# Patient Record
Sex: FEMALE | Race: WHITE
[De-identification: names, ages, dates, MRNs, and addresses within clinical notes are randomized per-mention and may not be internally consistent; named-entity substitution may affect disease eponyms.]

---

## 2023-02-05 ENCOUNTER — HOSPITAL ENCOUNTER (INPATIENT)
Dept: HOSPITAL 4 - SED | Age: 88
LOS: 8 days | Discharge: NURSING, REHAB OR LONG-TERM CARE | DRG: 871 | End: 2023-02-13
Attending: FAMILY MEDICINE | Admitting: FAMILY MEDICINE
Payer: COMMERCIAL

## 2023-02-05 VITALS — SYSTOLIC BLOOD PRESSURE: 101 MMHG

## 2023-02-05 VITALS — BODY MASS INDEX: 16.05 KG/M2 | HEIGHT: 64 IN | WEIGHT: 94 LBS

## 2023-02-05 VITALS — SYSTOLIC BLOOD PRESSURE: 125 MMHG

## 2023-02-05 DIAGNOSIS — K44.9: ICD-10-CM

## 2023-02-05 DIAGNOSIS — E03.9: ICD-10-CM

## 2023-02-05 DIAGNOSIS — M54.9: ICD-10-CM

## 2023-02-05 DIAGNOSIS — G30.9: ICD-10-CM

## 2023-02-05 DIAGNOSIS — E86.1: ICD-10-CM

## 2023-02-05 DIAGNOSIS — Z86.73: ICD-10-CM

## 2023-02-05 DIAGNOSIS — D63.8: ICD-10-CM

## 2023-02-05 DIAGNOSIS — R62.7: ICD-10-CM

## 2023-02-05 DIAGNOSIS — Z87.440: ICD-10-CM

## 2023-02-05 DIAGNOSIS — Z66: ICD-10-CM

## 2023-02-05 DIAGNOSIS — J69.0: ICD-10-CM

## 2023-02-05 DIAGNOSIS — A41.9: Primary | ICD-10-CM

## 2023-02-05 DIAGNOSIS — N39.0: ICD-10-CM

## 2023-02-05 DIAGNOSIS — E83.41: ICD-10-CM

## 2023-02-05 DIAGNOSIS — F02.80: ICD-10-CM

## 2023-02-05 DIAGNOSIS — R13.10: ICD-10-CM

## 2023-02-05 DIAGNOSIS — N17.0: ICD-10-CM

## 2023-02-05 DIAGNOSIS — E87.0: ICD-10-CM

## 2023-02-05 DIAGNOSIS — G89.29: ICD-10-CM

## 2023-02-05 DIAGNOSIS — F41.9: ICD-10-CM

## 2023-02-05 DIAGNOSIS — R09.02: ICD-10-CM

## 2023-02-05 DIAGNOSIS — K21.9: ICD-10-CM

## 2023-02-05 DIAGNOSIS — E86.0: ICD-10-CM

## 2023-02-05 LAB
ALBUMIN SERPL BCP-MCNC: 3.6 G/DL (ref 3.4–4.8)
ALT SERPL W P-5'-P-CCNC: 49 U/L (ref 12–78)
ANION GAP SERPL CALCULATED.3IONS-SCNC: 13 MMOL/L (ref 5–15)
APPEARANCE UR: CLEAR
AST SERPL W P-5'-P-CCNC: 32 U/L (ref 10–37)
BACTERIA URNS QL MICRO: (no result) /HPF
BASOPHILS # BLD AUTO: 0.1 K/UL (ref 0–0.2)
BASOPHILS NFR BLD AUTO: 0.6 % (ref 0–2)
BILIRUB SERPL-MCNC: 0.2 MG/DL (ref 0–1)
BILIRUB UR QL STRIP: NEGATIVE
BUN SERPL-MCNC: 58 MG/DL (ref 8–21)
CALCIUM SERPL-MCNC: 10.1 MG/DL (ref 8.4–11)
CHLORIDE SERPL-SCNC: 127 MMOL/L (ref 98–107)
COLOR UR: YELLOW
CREAT SERPL-MCNC: 1.39 MG/DL (ref 0.55–1.3)
EOSINOPHIL # BLD AUTO: 0.2 K/UL (ref 0–0.4)
EOSINOPHIL NFR BLD AUTO: 1.4 % (ref 0–4)
ERYTHROCYTE [DISTWIDTH] IN BLOOD BY AUTOMATED COUNT: 15.5 % (ref 9–15)
GFR SERPL CREATININE-BSD FRML MDRD: (no result) ML/MIN (ref 90–?)
GLUCOSE SERPL-MCNC: 143 MG/DL (ref 70–99)
GLUCOSE UR STRIP-MCNC: NEGATIVE MG/DL
HCT VFR BLD AUTO: 45 % (ref 36–48)
HGB BLD-MCNC: 14.8 G/DL (ref 12–16)
HGB UR QL STRIP: (no result)
KETONES UR STRIP-MCNC: NEGATIVE MG/DL
LEUKOCYTE ESTERASE UR QL STRIP: (no result)
LYMPHOCYTES # BLD AUTO: 1.4 K/UL (ref 1–5.5)
LYMPHOCYTES NFR BLD AUTO: 11.9 % (ref 20.5–51.5)
MCH RBC QN AUTO: 29 PG (ref 27–31)
MCHC RBC AUTO-ENTMCNC: 33 % (ref 32–36)
MCV RBC AUTO: 88 FL (ref 79–98)
MONOCYTES # BLD MANUAL: 0.8 K/UL (ref 0–1)
MONOCYTES # BLD MANUAL: 7.1 % (ref 1.7–9.3)
NEUTROPHILS # BLD AUTO: 9.4 K/UL (ref 1.8–7.7)
NEUTROPHILS NFR BLD AUTO: 79 % (ref 40–70)
NITRITE UR QL STRIP: NEGATIVE
PH UR STRIP: 5.5 [PH] (ref 5–8)
PLATELET # BLD AUTO: 247 K/UL (ref 130–430)
PROT UR QL STRIP: (no result)
RBC # BLD AUTO: 5.1 MIL/UL (ref 4.2–6.2)
RBC #/AREA URNS HPF: (no result) /HPF (ref 0–3)
SP GR UR STRIP: 1.02 (ref 1–1.03)
UROBILINOGEN UR STRIP-MCNC: 0.2 MG/DL (ref 0.2–1)
WBC # BLD AUTO: 11.9 K/UL (ref 4.8–10.8)
WBC #/AREA URNS HPF: (no result) /HPF (ref 0–3)

## 2023-02-05 PROCEDURE — C9113 INJ PANTOPRAZOLE SODIUM, VIA: HCPCS

## 2023-02-05 PROCEDURE — G0378 HOSPITAL OBSERVATION PER HR: HCPCS

## 2023-02-05 RX ADMIN — POTASSIUM CHLORIDE, DEXTROSE MONOHYDRATE AND SODIUM CHLORIDE SCH MLS/HR: 150; 5; 450 INJECTION, SOLUTION INTRAVENOUS at 21:33

## 2023-02-05 RX ADMIN — LEVALBUTEROL SCH MG: 1.25 SOLUTION, CONCENTRATE RESPIRATORY (INHALATION) at 23:30

## 2023-02-05 NOTE — NUR
Patient will be admitted to care of Dr. Pederson.  Admitted to Tele unit.  Will 
go to room 104A.  Complete and up to date summary report printed. SBAR report 
to be given at bedside with opportunity for questions.

## 2023-02-05 NOTE — NUR
Medication reconciliation completed with information provided by KIMBERLY SALGADO. 
Any prior medication reconciliation on file was reviewed and corrected.

## 2023-02-05 NOTE — NUR
# 14 FR Mccord catheter with use of sterile technique. Immediate return of 150 
cc yellow urine noted.  Bedside drainage bag placed below level of bladder.  Pt 
tolerated procedure well.



Patient unable to toilet self.

## 2023-02-05 NOTE — NUR
Placed in room 04  . Placed on cardiac monitor, blood pressure machine and 
pulse oximeter. To gown for exam. Side rails up.

Report given to CHIDI LAST.

## 2023-02-05 NOTE — NUR
Admit bed requested 



Patient will be admitted to care of Dr. Pederson.  

Admitted to Tele unit.

Diagnosis Pneumonia, Dehydration, Acute Renal Failure

Inpatient (Yes or No)  Yes

Observation (Yes or No) No

Orientation concerns or request close to nursing station  (Yes or No) Yes

Covid Status Neg

On vent or bipap No

Isolation requirements No

Needs a sitter No

From Home (Yes or if No enter name of facility) No: [facility]

Requires Dialysis (Yes or No) No 

Med Rec Completed (Yes of No) No

## 2023-02-05 NOTE — NUR
ADMIT NOTE

Received pt from ER with a diagnosis of PNA,Dehydration and ARF. She is awake, AO x1-2 to 
name and partial  (month & day). Admission process initiated. Son and daughter in law at 
bedside w/patient. Oriented to pain management, safety and call light. Bed is locked in 
lowest position, side rails up 3x and bed alarm on.

## 2023-02-05 NOTE — NUR
PT BIBA AWAKE AND CONFUSED, AOX1.  PT WAS BROUGHT IN FROM [facility] FOR 02 
DESATURATION.  PARAMEDICS ON SCENE STATED 76% ON RA.  PARAMEDICS ADMINISTER A 
NON-REBREATHERE MASK AND STATED O2 SAT WENT UP TO 91.  UPON ADDMISSION TO ER 
HER 02 SATURATION WAS 83% ON 15 LPM NON-REBREATHER.

## 2023-02-06 VITALS — SYSTOLIC BLOOD PRESSURE: 97 MMHG

## 2023-02-06 VITALS — SYSTOLIC BLOOD PRESSURE: 140 MMHG

## 2023-02-06 VITALS — SYSTOLIC BLOOD PRESSURE: 84 MMHG

## 2023-02-06 VITALS — SYSTOLIC BLOOD PRESSURE: 121 MMHG

## 2023-02-06 VITALS — SYSTOLIC BLOOD PRESSURE: 102 MMHG

## 2023-02-06 VITALS — SYSTOLIC BLOOD PRESSURE: 115 MMHG

## 2023-02-06 LAB
ANION GAP SERPL CALCULATED.3IONS-SCNC: 8 MMOL/L (ref 5–15)
BASOPHILS # BLD AUTO: 0.1 K/UL (ref 0–0.2)
BASOPHILS NFR BLD AUTO: 0.6 % (ref 0–2)
BUN SERPL-MCNC: 45 MG/DL (ref 8–21)
CALCIUM SERPL-MCNC: 8.8 MG/DL (ref 8.4–11)
CHLORIDE SERPL-SCNC: 133 MMOL/L (ref 98–107)
CREAT SERPL-MCNC: 1.14 MG/DL (ref 0.55–1.3)
EOSINOPHIL # BLD AUTO: 0.4 K/UL (ref 0–0.4)
EOSINOPHIL NFR BLD AUTO: 4.3 % (ref 0–4)
ERYTHROCYTE [DISTWIDTH] IN BLOOD BY AUTOMATED COUNT: 15.4 % (ref 9–15)
GFR SERPL CREATININE-BSD FRML MDRD: (no result) ML/MIN (ref 90–?)
GLUCOSE SERPL-MCNC: 104 MG/DL (ref 70–99)
HCT VFR BLD AUTO: 34.9 % (ref 36–48)
HGB BLD-MCNC: 11.6 G/DL (ref 12–16)
LYMPHOCYTES # BLD AUTO: 0.8 K/UL (ref 1–5.5)
LYMPHOCYTES NFR BLD AUTO: 7.9 % (ref 20.5–51.5)
MCH RBC QN AUTO: 29 PG (ref 27–31)
MCHC RBC AUTO-ENTMCNC: 33 % (ref 32–36)
MCV RBC AUTO: 88 FL (ref 79–98)
MONOCYTES # BLD MANUAL: 0.2 K/UL (ref 0–1)
MONOCYTES # BLD MANUAL: 2.2 % (ref 1.7–9.3)
NEUTROPHILS # BLD AUTO: 8.7 K/UL (ref 1.8–7.7)
NEUTROPHILS NFR BLD AUTO: 85 % (ref 40–70)
PLATELET # BLD AUTO: 174 K/UL (ref 130–430)
RBC # BLD AUTO: 3.95 MIL/UL (ref 4.2–6.2)
WBC # BLD AUTO: 10.2 K/UL (ref 4.8–10.8)

## 2023-02-06 RX ADMIN — LEVALBUTEROL SCH MG: 1.25 SOLUTION, CONCENTRATE RESPIRATORY (INHALATION) at 07:32

## 2023-02-06 RX ADMIN — SODIUM CHLORIDE SCH MLS/HR: 0.9 INJECTION, SOLUTION INTRAVENOUS at 02:26

## 2023-02-06 RX ADMIN — BRIMONIDINE TARTRATE SCH ML: 2 SOLUTION/ DROPS OPHTHALMIC at 09:03

## 2023-02-06 RX ADMIN — PRAMIPEXOLE DIHYDROCHLORIDE SCH MG: 0.25 TABLET ORAL at 20:27

## 2023-02-06 RX ADMIN — POTASSIUM CHLORIDE, DEXTROSE MONOHYDRATE AND SODIUM CHLORIDE SCH MLS/HR: 150; 5; 450 INJECTION, SOLUTION INTRAVENOUS at 10:56

## 2023-02-06 RX ADMIN — TIMOLOL MALEATE SCH ML: 5 SOLUTION/ DROPS OPHTHALMIC at 09:03

## 2023-02-06 RX ADMIN — Medication SCH GM: at 09:00

## 2023-02-06 RX ADMIN — Medication SCH MG: at 09:01

## 2023-02-06 RX ADMIN — TIMOLOL MALEATE SCH ML: 5 SOLUTION/ DROPS OPHTHALMIC at 21:10

## 2023-02-06 RX ADMIN — DEXTROSE MONOHYDRATE SCH MLS/HR: 50 INJECTION, SOLUTION INTRAVENOUS at 23:29

## 2023-02-06 RX ADMIN — LEVALBUTEROL SCH MG: 1.25 SOLUTION, CONCENTRATE RESPIRATORY (INHALATION) at 20:32

## 2023-02-06 RX ADMIN — LEVALBUTEROL SCH MG: 1.25 SOLUTION, CONCENTRATE RESPIRATORY (INHALATION) at 16:29

## 2023-02-06 RX ADMIN — MEGESTROL ACETATE SCH MG: 400 SUSPENSION ORAL at 09:00

## 2023-02-06 RX ADMIN — BACLOFEN SCH MG: 10 TABLET ORAL at 09:00

## 2023-02-06 RX ADMIN — BRIMONIDINE TARTRATE SCH ML: 2 SOLUTION/ DROPS OPHTHALMIC at 21:10

## 2023-02-06 RX ADMIN — POTASSIUM CHLORIDE AND DEXTROSE MONOHYDRATE SCH MLS/HR: 150; 5 INJECTION, SOLUTION INTRAVENOUS at 19:00

## 2023-02-06 RX ADMIN — DEXTROSE MONOHYDRATE SCH MLS/HR: 50 INJECTION, SOLUTION INTRAVENOUS at 01:26

## 2023-02-06 RX ADMIN — PANTOPRAZOLE SODIUM SCH MG: 40 TABLET, DELAYED RELEASE ORAL at 09:01

## 2023-02-06 RX ADMIN — MEGESTROL ACETATE SCH MG: 400 SUSPENSION ORAL at 20:27

## 2023-02-06 RX ADMIN — DOCUSATE SODIUM SCH MG: 100 CAPSULE, LIQUID FILLED ORAL at 09:01

## 2023-02-06 RX ADMIN — POTASSIUM CHLORIDE, DEXTROSE MONOHYDRATE AND SODIUM CHLORIDE SCH MLS/HR: 150; 5; 450 INJECTION, SOLUTION INTRAVENOUS at 06:45

## 2023-02-06 RX ADMIN — BACLOFEN SCH MG: 10 TABLET ORAL at 20:26

## 2023-02-06 RX ADMIN — Medication SCH CAP: at 09:00

## 2023-02-06 RX ADMIN — AMITRIPTYLINE HYDROCHLORIDE SCH MG: 10 TABLET, FILM COATED ORAL at 20:26

## 2023-02-06 RX ADMIN — Medication SCH CAP: at 20:26

## 2023-02-06 RX ADMIN — LEVOTHYROXINE SODIUM SCH MG: 50 TABLET ORAL at 07:08

## 2023-02-06 RX ADMIN — LATANOPROST SCH ML: 50 SOLUTION OPHTHALMIC at 21:10

## 2023-02-06 RX ADMIN — ENOXAPARIN SODIUM SCH MG: 30 INJECTION SUBCUTANEOUS at 21:11

## 2023-02-06 RX ADMIN — LACTULOSE SCH ML: 10 SOLUTION ORAL; RECTAL at 09:00

## 2023-02-06 RX ADMIN — DOCUSATE SODIUM SCH MG: 100 CAPSULE, LIQUID FILLED ORAL at 20:26

## 2023-02-06 NOTE — NUR
CLOSING NOTE

SCHEDULED TABLET GIVEN, CRUSHED W/ APPLESAUCE. RESTING IN BED AWAKE, NOT PULLING ON LINES. 
WILL ENDORSE CARE

## 2023-02-06 NOTE — NUR
ANTIBIOTIC

ADMINISTERED DUE ANTIBIOTIC, INFUSING WELL, PATIENT IS AWAKE, SHE DOES NOT CLOSE HER EYES.

## 2023-02-06 NOTE — NUR
ST EVALUATION COMPLETED.  ST TX NOT INDICATED AT THIS TIME.  PT UNABLE TO DEMONSTRATE 
VOLITIONAL COUGH OR SWALLOW.  THERMAL STIMULATION DID NOT EVOKE A SWALLOW.  RECOMMEND NPO 
WITH ALTERNATIVE MEANS OF NUTRITION.

## 2023-02-06 NOTE — NUR
CONSULTATION PAGED/CALLED

Reason for Consultation: VASU

Person Who was Notified: ISHAAN

Consulting Physician:  DR. FLORES

Consultant Specialty: NEPHROLOGIST

Ordering Physician: DR. WISEMAN

## 2023-02-06 NOTE — NUR
OPENING NOTES

Patient resting in bed - no s/s pain or distress noted. Respirations even and unlabored, 
head of bed elevated IV site patent - no s/s redness, infection, or infiltration 2L NC. Bed 
locked and in lowest position. Call light within reach - bed alarm on.

## 2023-02-07 VITALS — SYSTOLIC BLOOD PRESSURE: 99 MMHG

## 2023-02-07 VITALS — SYSTOLIC BLOOD PRESSURE: 101 MMHG

## 2023-02-07 VITALS — SYSTOLIC BLOOD PRESSURE: 106 MMHG

## 2023-02-07 VITALS — SYSTOLIC BLOOD PRESSURE: 111 MMHG

## 2023-02-07 VITALS — SYSTOLIC BLOOD PRESSURE: 103 MMHG

## 2023-02-07 LAB
ANION GAP SERPL CALCULATED.3IONS-SCNC: 9 MMOL/L (ref 5–15)
BASOPHILS # BLD AUTO: 0 K/UL (ref 0–0.2)
BASOPHILS NFR BLD AUTO: 0.9 % (ref 0–2)
BUN SERPL-MCNC: 30 MG/DL (ref 8–21)
CALCIUM SERPL-MCNC: 9.2 MG/DL (ref 8.4–11)
CHLORIDE SERPL-SCNC: 128 MMOL/L (ref 98–107)
CREAT SERPL-MCNC: 0.84 MG/DL (ref 0.55–1.3)
EOSINOPHIL # BLD AUTO: 0.8 K/UL (ref 0–0.4)
EOSINOPHIL NFR BLD AUTO: 14.6 % (ref 0–4)
ERYTHROCYTE [DISTWIDTH] IN BLOOD BY AUTOMATED COUNT: 15.7 % (ref 9–15)
GFR SERPL CREATININE-BSD FRML MDRD: (no result) ML/MIN (ref 90–?)
GLUCOSE SERPL-MCNC: 92 MG/DL (ref 70–99)
HCT VFR BLD AUTO: 31.2 % (ref 36–48)
HGB BLD-MCNC: 10.2 G/DL (ref 12–16)
LYMPHOCYTES # BLD AUTO: 0.8 K/UL (ref 1–5.5)
LYMPHOCYTES NFR BLD AUTO: 14.5 % (ref 20.5–51.5)
MCH RBC QN AUTO: 29 PG (ref 27–31)
MCHC RBC AUTO-ENTMCNC: 33 % (ref 32–36)
MCV RBC AUTO: 90 FL (ref 79–98)
MONOCYTES # BLD MANUAL: 0.4 K/UL (ref 0–1)
MONOCYTES # BLD MANUAL: 6.5 % (ref 1.7–9.3)
NEUTROPHILS # BLD AUTO: 3.6 K/UL (ref 1.8–7.7)
NEUTROPHILS NFR BLD AUTO: 63.5 % (ref 40–70)
PLATELET # BLD AUTO: 125 K/UL (ref 130–430)
RBC # BLD AUTO: 3.46 MIL/UL (ref 4.2–6.2)
WBC # BLD AUTO: 5.6 K/UL (ref 4.8–10.8)

## 2023-02-07 RX ADMIN — TIMOLOL MALEATE SCH ML: 5 SOLUTION/ DROPS OPHTHALMIC at 22:18

## 2023-02-07 RX ADMIN — POTASSIUM CHLORIDE AND DEXTROSE MONOHYDRATE SCH MLS/HR: 150; 5 INJECTION, SOLUTION INTRAVENOUS at 03:25

## 2023-02-07 RX ADMIN — SODIUM CHLORIDE SCH MG: 9 INJECTION, SOLUTION INTRAVENOUS at 22:18

## 2023-02-07 RX ADMIN — LATANOPROST SCH ML: 50 SOLUTION OPHTHALMIC at 22:18

## 2023-02-07 RX ADMIN — TIMOLOL MALEATE SCH ML: 5 SOLUTION/ DROPS OPHTHALMIC at 08:43

## 2023-02-07 RX ADMIN — BRIMONIDINE TARTRATE SCH ML: 2 SOLUTION/ DROPS OPHTHALMIC at 22:18

## 2023-02-07 RX ADMIN — LEVOTHYROXINE SODIUM SCH MG: 50 TABLET ORAL at 06:06

## 2023-02-07 RX ADMIN — SODIUM CHLORIDE SCH MLS/HR: 0.9 INJECTION, SOLUTION INTRAVENOUS at 23:10

## 2023-02-07 RX ADMIN — LEVALBUTEROL SCH MG: 1.25 SOLUTION, CONCENTRATE RESPIRATORY (INHALATION) at 19:42

## 2023-02-07 RX ADMIN — PRAMIPEXOLE DIHYDROCHLORIDE SCH MG: 0.25 TABLET ORAL at 21:00

## 2023-02-07 RX ADMIN — SODIUM CHLORIDE SCH MLS/HR: 0.9 INJECTION, SOLUTION INTRAVENOUS at 00:48

## 2023-02-07 RX ADMIN — LEVALBUTEROL SCH MG: 1.25 SOLUTION, CONCENTRATE RESPIRATORY (INHALATION) at 07:31

## 2023-02-07 RX ADMIN — PANTOPRAZOLE SODIUM SCH MG: 40 TABLET, DELAYED RELEASE ORAL at 08:47

## 2023-02-07 RX ADMIN — LEVALBUTEROL SCH MG: 1.25 SOLUTION, CONCENTRATE RESPIRATORY (INHALATION) at 16:06

## 2023-02-07 RX ADMIN — LACTULOSE SCH ML: 10 SOLUTION ORAL; RECTAL at 08:47

## 2023-02-07 RX ADMIN — DOCUSATE SODIUM SCH MG: 100 CAPSULE, LIQUID FILLED ORAL at 21:00

## 2023-02-07 RX ADMIN — ENOXAPARIN SODIUM SCH MG: 30 INJECTION SUBCUTANEOUS at 22:20

## 2023-02-07 RX ADMIN — BACLOFEN SCH MG: 10 TABLET ORAL at 08:46

## 2023-02-07 RX ADMIN — Medication SCH GM: at 08:47

## 2023-02-07 RX ADMIN — DEXTROSE MONOHYDRATE SCH MLS/HR: 50 INJECTION, SOLUTION INTRAVENOUS at 22:17

## 2023-02-07 RX ADMIN — POTASSIUM CHLORIDE AND DEXTROSE MONOHYDRATE SCH MLS/HR: 150; 5 INJECTION, SOLUTION INTRAVENOUS at 22:20

## 2023-02-07 RX ADMIN — MEGESTROL ACETATE SCH MG: 400 SUSPENSION ORAL at 21:00

## 2023-02-07 RX ADMIN — AMITRIPTYLINE HYDROCHLORIDE SCH MG: 10 TABLET, FILM COATED ORAL at 21:00

## 2023-02-07 RX ADMIN — BACLOFEN SCH MG: 10 TABLET ORAL at 21:00

## 2023-02-07 RX ADMIN — Medication SCH MG: at 08:46

## 2023-02-07 RX ADMIN — BRIMONIDINE TARTRATE SCH ML: 2 SOLUTION/ DROPS OPHTHALMIC at 08:44

## 2023-02-07 RX ADMIN — Medication SCH CAP: at 21:00

## 2023-02-07 RX ADMIN — Medication SCH CAP: at 08:46

## 2023-02-07 RX ADMIN — MEGESTROL ACETATE SCH MG: 400 SUSPENSION ORAL at 08:47

## 2023-02-07 RX ADMIN — DOCUSATE SODIUM SCH MG: 100 CAPSULE, LIQUID FILLED ORAL at 08:46

## 2023-02-07 NOTE — NUR
PM ASSESSMENT;

-Patient is awake, alert, oriented X 2. Patient oriented to hospital room, call light, 
toileting, pain management and safety-teach back done. Side rails x3,bed alarmed, call light 
within reach. Aspiration precaution in place. Keep HOB > 30 degree entire time. Pt is NPO/ 
failed swallow eval. Unable to discuss poc d/t cognitive limitation noted.  Pt is on 2L n/c 
oxy [id number]%. Cont to monitor pt.

## 2023-02-07 NOTE — NUR
CLOSING NOTES

Patient resting in bed - no s/s pain or distress noted. Respirations even and unlabored, 
head of bed elevated IV site patent - no s/s redness, infection, or infiltration 2L NC. Bed 
locked and in lowest position. Call light within reach - bed alarm on.

## 2023-02-07 NOTE — NUR
Dietitian Recommendations 



* Continue NPO per SLP, with alternate means of nutrition if pt/family consent

* Consider Jevity 1.2 @ 50mL/hr(goal) via GT

  Provides:1440 kcal, 67 g PRO, 20g fiber, 968mL free water

  Meets: 107% of lower est kcal, 103 upper est PRO, 75% lower est fluid needs

* Consider another swallow eval if family doesn't consent for GT



GS, MPH, RD



Please refer to Nutrition F/U for further details. Thanks!


-------------------------------------------------------------------------------

Addendum: 02/07/23 at 1402 by Maria Del Carmen Nice RD

-------------------------------------------------------------------------------

Amended: Links added.

-------------------------------------------------------------------------------

Addendum: 02/07/23 at 1402 by Maria Del Carmen Nice RD

-------------------------------------------------------------------------------

New Dietitian Recommendations

* Continue NPO per SLP, with alternate means of nutrition if pt/family 

  consent

* Consider Jevity 1.2 @ 50mL/hr(goal) via GT

  Provides:1440 kcal, 67 g PRO, 20g fiber, 968mL free water

  Meets: 107% of lower est kcal, 103 upper est PRO, 75% lower est fluid 

needs

* Consider another swallow eval if family doesn't consent for GT

* Nursing, please swab pt's mouth every hour or 2

## 2023-02-07 NOTE — NUR
NOTES; INFORMED DR. WISEMAN THAT SONIYA -DTR REQUESTS ANOTHER SWALLOW EVALUATION AGAIN 
TOMORROW AND SHE WANTS TO ATTEND WHEN SPEECH THERAPIST WILL PERFORM SWALLOW EVALUATION AND 
DTR WANTS PT (MOM) TO RECEIVE ATIVAN IVP FOR AGITATION. DR. WISEMAN ORDERED SWALLOW 
EVALUATION AGAIN BUT NO ATIVAN ORDER. WILL ENDORSE DAY SHIFT NURSE TO CALL SONIYA (DTR) 
WHEN SPEECH THERAPIST WILL PERFORM SWALLOW EVALU SO DTR CAN ATTEND DURING THE TESTING 
SESSION.

## 2023-02-07 NOTE — NUR
DR WISEMAN WAS HERE AND SEEN AND EXAMINED PT. MD IS AWARE OF ALL THE ABDNORMAL LABS.

MD SPOKE WITH PATIENTS FAMILY RE PT'S CONDITION THIS AFTERNOON.

## 2023-02-08 VITALS — SYSTOLIC BLOOD PRESSURE: 108 MMHG

## 2023-02-08 VITALS — SYSTOLIC BLOOD PRESSURE: 133 MMHG

## 2023-02-08 VITALS — SYSTOLIC BLOOD PRESSURE: 132 MMHG

## 2023-02-08 LAB
ANION GAP SERPL CALCULATED.3IONS-SCNC: 9 MMOL/L (ref 5–15)
BASOPHILS # BLD AUTO: 0 K/UL (ref 0–0.2)
BASOPHILS NFR BLD AUTO: 0.7 % (ref 0–2)
BUN SERPL-MCNC: 21 MG/DL (ref 8–21)
CALCIUM SERPL-MCNC: 9.2 MG/DL (ref 8.4–11)
CHLORIDE SERPL-SCNC: 121 MMOL/L (ref 98–107)
CREAT SERPL-MCNC: 0.77 MG/DL (ref 0.55–1.3)
EOSINOPHIL # BLD AUTO: 0.8 K/UL (ref 0–0.4)
EOSINOPHIL NFR BLD AUTO: 11.3 % (ref 0–4)
ERYTHROCYTE [DISTWIDTH] IN BLOOD BY AUTOMATED COUNT: 15 % (ref 9–15)
GFR SERPL CREATININE-BSD FRML MDRD: (no result) ML/MIN (ref 90–?)
GLUCOSE SERPL-MCNC: 102 MG/DL (ref 70–99)
HCT VFR BLD AUTO: 31.3 % (ref 36–48)
HGB BLD-MCNC: 10.3 G/DL (ref 12–16)
LYMPHOCYTES # BLD AUTO: 1.2 K/UL (ref 1–5.5)
LYMPHOCYTES NFR BLD AUTO: 15.9 % (ref 20.5–51.5)
MCH RBC QN AUTO: 29 PG (ref 27–31)
MCHC RBC AUTO-ENTMCNC: 33 % (ref 32–36)
MCV RBC AUTO: 89 FL (ref 79–98)
MONOCYTES # BLD MANUAL: 0.4 K/UL (ref 0–1)
MONOCYTES # BLD MANUAL: 6 % (ref 1.7–9.3)
NEUTROPHILS # BLD AUTO: 4.9 K/UL (ref 1.8–7.7)
NEUTROPHILS NFR BLD AUTO: 66.1 % (ref 40–70)
PLATELET # BLD AUTO: 119 K/UL (ref 130–430)
RBC # BLD AUTO: 3.52 MIL/UL (ref 4.2–6.2)
WBC # BLD AUTO: 7.4 K/UL (ref 4.8–10.8)

## 2023-02-08 RX ADMIN — SODIUM CHLORIDE SCH MG: 9 INJECTION, SOLUTION INTRAVENOUS at 20:57

## 2023-02-08 RX ADMIN — BRIMONIDINE TARTRATE SCH ML: 2 SOLUTION/ DROPS OPHTHALMIC at 21:19

## 2023-02-08 RX ADMIN — Medication SCH CAP: at 21:56

## 2023-02-08 RX ADMIN — SODIUM CHLORIDE SCH MG: 9 INJECTION, SOLUTION INTRAVENOUS at 08:54

## 2023-02-08 RX ADMIN — Medication SCH GM: at 07:50

## 2023-02-08 RX ADMIN — PRAMIPEXOLE DIHYDROCHLORIDE SCH MG: 0.25 TABLET ORAL at 21:56

## 2023-02-08 RX ADMIN — Medication SCH MG: at 07:48

## 2023-02-08 RX ADMIN — Medication SCH CAP: at 07:49

## 2023-02-08 RX ADMIN — POTASSIUM CHLORIDE AND DEXTROSE MONOHYDRATE SCH MLS/HR: 150; 5 INJECTION, SOLUTION INTRAVENOUS at 17:50

## 2023-02-08 RX ADMIN — TIMOLOL MALEATE SCH ML: 5 SOLUTION/ DROPS OPHTHALMIC at 08:48

## 2023-02-08 RX ADMIN — DOCUSATE SODIUM SCH MG: 100 CAPSULE, LIQUID FILLED ORAL at 21:00

## 2023-02-08 RX ADMIN — LATANOPROST SCH ML: 50 SOLUTION OPHTHALMIC at 21:27

## 2023-02-08 RX ADMIN — POTASSIUM CHLORIDE AND DEXTROSE MONOHYDRATE SCH MLS/HR: 150; 5 INJECTION, SOLUTION INTRAVENOUS at 12:02

## 2023-02-08 RX ADMIN — TIMOLOL MALEATE SCH ML: 5 SOLUTION/ DROPS OPHTHALMIC at 21:17

## 2023-02-08 RX ADMIN — LEVALBUTEROL SCH MG: 1.25 SOLUTION, CONCENTRATE RESPIRATORY (INHALATION) at 14:49

## 2023-02-08 RX ADMIN — MEGESTROL ACETATE SCH MG: 400 SUSPENSION ORAL at 20:52

## 2023-02-08 RX ADMIN — LEVALBUTEROL SCH MG: 1.25 SOLUTION, CONCENTRATE RESPIRATORY (INHALATION) at 23:15

## 2023-02-08 RX ADMIN — BACLOFEN SCH MG: 10 TABLET ORAL at 07:49

## 2023-02-08 RX ADMIN — LEVOTHYROXINE SODIUM SCH MG: 50 TABLET ORAL at 05:04

## 2023-02-08 RX ADMIN — MEGESTROL ACETATE SCH MG: 400 SUSPENSION ORAL at 07:49

## 2023-02-08 RX ADMIN — DOCUSATE SODIUM SCH MG: 100 CAPSULE, LIQUID FILLED ORAL at 07:49

## 2023-02-08 RX ADMIN — ENOXAPARIN SODIUM SCH MG: 30 INJECTION SUBCUTANEOUS at 21:24

## 2023-02-08 RX ADMIN — BRIMONIDINE TARTRATE SCH ML: 2 SOLUTION/ DROPS OPHTHALMIC at 08:48

## 2023-02-08 RX ADMIN — AMITRIPTYLINE HYDROCHLORIDE SCH MG: 10 TABLET, FILM COATED ORAL at 21:56

## 2023-02-08 RX ADMIN — BACLOFEN SCH MG: 10 TABLET ORAL at 20:50

## 2023-02-08 RX ADMIN — LEVALBUTEROL SCH MG: 1.25 SOLUTION, CONCENTRATE RESPIRATORY (INHALATION) at 08:12

## 2023-02-08 RX ADMIN — LACTULOSE SCH ML: 10 SOLUTION ORAL; RECTAL at 07:49

## 2023-02-08 NOTE — NUR
ROUNDS; 

-Pt is asleep. NO s/s any acute distress noted. All safety measures in place. Call light 
w/in reach. Cont to monitor pt.

## 2023-02-08 NOTE — NUR
NOTES; NOTIFIED Dr. Sanders that  DR. WISEMAN was notified REGARDING CRITICAL LAB 
CHLORIDE=121 (TRENDING DOWN), NO FURTHER ORDER PER MD. No further per Dr. Sanders too.

## 2023-02-08 NOTE — NUR
OPENING NOTES:

RECEIVED BEDSIDE SBAR FROM PM SHIFT NURSE,  PATIENT STABLE NO S/S OF ANY DISTRESS, RESTING 
IN BED WITH EYES CLOSED, BED AT LOW AND LOCKED POSITION CALL LIGHT IN REACH, SAFETY CHECKS 
DONE THIS AM AND WILL CONT THOUGHT THE DAY. WILL MONITOR PATIENT AS PER ORDERS.

## 2023-02-08 NOTE — NUR
ST EVALUATION COMPLETED.  ST TX NOT INDICATED AT THIS TIME.  RECOMMEND PO DIET OF PUREE AND 
HONEY THICK LIQUIDS.  1:1 FEEDER AND FULL ASPIRATION PRECAUTIONS.

## 2023-02-08 NOTE — NUR
NOTES; NOTIFIED DR. WISEMAN REGARDING CRITICAL LAB CHLORIDE=121 (TRENDING DOWN), NO FURTHER 
ORDER PER MD.

## 2023-02-08 NOTE — NUR
CLOSING NOTES;

-Pt is asleep. NO s/s any acute distress noted. IV site patent drsg cdi. IVF infusing well. 
All safety measures in place. Mccord cath w/ gravity drains  yellow urine output. Call light 
w/in reach. Pt's condition stable. Will endorse to day shift nurse to cont care.

## 2023-02-09 VITALS — SYSTOLIC BLOOD PRESSURE: 129 MMHG

## 2023-02-09 VITALS — SYSTOLIC BLOOD PRESSURE: 148 MMHG

## 2023-02-09 VITALS — SYSTOLIC BLOOD PRESSURE: 138 MMHG

## 2023-02-09 VITALS — SYSTOLIC BLOOD PRESSURE: 156 MMHG

## 2023-02-09 VITALS — SYSTOLIC BLOOD PRESSURE: 147 MMHG

## 2023-02-09 VITALS — SYSTOLIC BLOOD PRESSURE: 154 MMHG

## 2023-02-09 LAB
ANION GAP SERPL CALCULATED.3IONS-SCNC: 11 MMOL/L (ref 5–15)
BUN SERPL-MCNC: 13 MG/DL (ref 8–21)
CALCIUM SERPL-MCNC: 9.3 MG/DL (ref 8.4–11)
CHLORIDE SERPL-SCNC: 107 MMOL/L (ref 98–107)
CREAT SERPL-MCNC: 0.59 MG/DL (ref 0.55–1.3)
GFR SERPL CREATININE-BSD FRML MDRD: (no result) ML/MIN (ref 90–?)
GLUCOSE SERPL-MCNC: 115 MG/DL (ref 70–99)

## 2023-02-09 RX ADMIN — SODIUM CHLORIDE SCH MLS/HR: 0.9 INJECTION, SOLUTION INTRAVENOUS at 04:12

## 2023-02-09 RX ADMIN — LATANOPROST SCH ML: 50 SOLUTION OPHTHALMIC at 22:20

## 2023-02-09 RX ADMIN — BRIMONIDINE TARTRATE SCH ML: 2 SOLUTION/ DROPS OPHTHALMIC at 22:20

## 2023-02-09 RX ADMIN — MEGESTROL ACETATE SCH MG: 400 SUSPENSION ORAL at 22:22

## 2023-02-09 RX ADMIN — LACTULOSE SCH ML: 10 SOLUTION ORAL; RECTAL at 09:12

## 2023-02-09 RX ADMIN — DEXTROSE MONOHYDRATE SCH MLS/HR: 50 INJECTION, SOLUTION INTRAVENOUS at 03:17

## 2023-02-09 RX ADMIN — DEXTROSE MONOHYDRATE SCH MLS/HR: 50 INJECTION, SOLUTION INTRAVENOUS at 22:27

## 2023-02-09 RX ADMIN — LEVALBUTEROL SCH MG: 1.25 SOLUTION, CONCENTRATE RESPIRATORY (INHALATION) at 15:14

## 2023-02-09 RX ADMIN — TIMOLOL MALEATE SCH ML: 5 SOLUTION/ DROPS OPHTHALMIC at 09:13

## 2023-02-09 RX ADMIN — SODIUM CHLORIDE SCH MG: 9 INJECTION, SOLUTION INTRAVENOUS at 09:28

## 2023-02-09 RX ADMIN — SODIUM CHLORIDE SCH MG: 9 INJECTION, SOLUTION INTRAVENOUS at 22:22

## 2023-02-09 RX ADMIN — TIMOLOL MALEATE SCH ML: 5 SOLUTION/ DROPS OPHTHALMIC at 22:20

## 2023-02-09 RX ADMIN — Medication SCH CAP: at 22:22

## 2023-02-09 RX ADMIN — SODIUM CHLORIDE SCH MLS/HR: 0.9 INJECTION, SOLUTION INTRAVENOUS at 22:28

## 2023-02-09 RX ADMIN — Medication SCH CAP: at 09:11

## 2023-02-09 RX ADMIN — BRIMONIDINE TARTRATE SCH ML: 2 SOLUTION/ DROPS OPHTHALMIC at 09:12

## 2023-02-09 RX ADMIN — BACLOFEN SCH MG: 10 TABLET ORAL at 09:11

## 2023-02-09 RX ADMIN — AMITRIPTYLINE HYDROCHLORIDE SCH MG: 10 TABLET, FILM COATED ORAL at 22:22

## 2023-02-09 RX ADMIN — DOCUSATE SODIUM SCH MG: 100 CAPSULE, LIQUID FILLED ORAL at 22:22

## 2023-02-09 RX ADMIN — POTASSIUM CHLORIDE AND DEXTROSE MONOHYDRATE SCH MLS/HR: 150; 5 INJECTION, SOLUTION INTRAVENOUS at 09:00

## 2023-02-09 RX ADMIN — DOCUSATE SODIUM SCH MG: 100 CAPSULE, LIQUID FILLED ORAL at 09:11

## 2023-02-09 RX ADMIN — LEVALBUTEROL SCH MG: 1.25 SOLUTION, CONCENTRATE RESPIRATORY (INHALATION) at 07:13

## 2023-02-09 RX ADMIN — Medication SCH MG: at 09:11

## 2023-02-09 RX ADMIN — LEVOTHYROXINE SODIUM SCH MG: 50 TABLET ORAL at 06:11

## 2023-02-09 RX ADMIN — Medication SCH GM: at 09:11

## 2023-02-09 RX ADMIN — PRAMIPEXOLE DIHYDROCHLORIDE SCH MG: 0.25 TABLET ORAL at 22:21

## 2023-02-09 RX ADMIN — ENOXAPARIN SODIUM SCH MG: 30 INJECTION SUBCUTANEOUS at 22:19

## 2023-02-09 RX ADMIN — LEVALBUTEROL SCH MG: 1.25 SOLUTION, CONCENTRATE RESPIRATORY (INHALATION) at 23:38

## 2023-02-09 RX ADMIN — MEGESTROL ACETATE SCH MG: 400 SUSPENSION ORAL at 09:12

## 2023-02-09 NOTE — NUR
CLOSING NOTES

PT TOOK HER O700AM MEDICATION WITH EASE. BED IN LOW POSITION WITH LOCK IN PLACE ,CALL LIGTH 
WITHIN REACH [facility] BED ARLARM ON.WITH PADDED RAILS ON BOTH SIDESFOR PREVENTION OF SEIZURES 
AND FALL PRECAUTION IN PLACE.PT IS NOW STABLE AND RESTING..

## 2023-02-09 NOTE — NUR
Report received from day shift RN for continuity of care. Patient in stable condition. 
Resting at the moment.

## 2023-02-09 NOTE — NUR
Patient discharge to [facility] today. Room 15. Transportation with [facility].  
[phone]. Left message with  for RN.. will place packet on unit.

## 2023-02-09 NOTE — NUR
notes

pt is lying down in bed in a low position with call light within reach. Pt was able to 
tolerate her medications .sleeping 
comfortably.

## 2023-02-09 NOTE — NUR
CONSULTATION PAGED/CALLED

Reason for Consultation: []  GT PLACEMENT

Person Who was Notified: []  WESLY

Consulting Physician: []  DR CANALES

Consultant Specialty: []  GI

Ordering Physician: []   DR WISEMAN

## 2023-02-09 NOTE — NUR
CONSULTATION PAGED



REASON FOR CONSULTATION:2ND OPINION ON CONDITION, G-TUBE PLACEMENT, POSSIBLE HOSPICE

WAS CONSULT CALLED?Y

PERSON WHO WAS NOTIFIED:ARELI GENAO

CONSULTING PHYSICIAN:ARELI GENAO

CONSULTANT SPECIALTY:INTERNAL MEDICINE

CONSULTANT PHONE NUMBER:[phone]

REQUESTING PHYSICIAN:EBONY GALINDO

## 2023-02-09 NOTE — NUR
SPOKE WITH DR IVONE ENNIS TO CHANGE BECLOFEN TO PRN, CONSULT  2ND OPINION WITH DR. SANTAMARIA.

  FAMILY REQUESTING GI CONSULT TO STRIP MEMBRAINS IN THROAT SO PATIENT COULD SWALOW BETTER,  
UA REPEAT,

## 2023-02-09 NOTE — NUR
PUT ON WILL STAT[facility].   WAS SUPPOSED TO BE AT 1700 BUT DISCHARGE 
HAVE BEEN HELD.

SPOKE TO KEYLA OF DISPATCH.

## 2023-02-09 NOTE — NUR
DAUGHTER SONIYA CALLED REFUSED FOR PATIENT TO BE D/C, REQUESTING G-TUBE PLACEMENT. SPOKE 
WITH DR ASHLEY ENNIS FOR CONSULT WITH DR DELGADILLO

## 2023-02-09 NOTE — NUR
OPENING NOTES:

RECEIVED BEDSIDE SBAR FROM PM SHIFT NURSE, PATIENT RESTING IN BED WITH EYES CLOSED, NO S/S 
OF ANY DISTRESS,  BED AT LOCKED AND LOW POSITION, CALL LIGHT IN REACH, IV INTACT,  SAFETY 
CHECKS DONE AND WILL DO THOUGHT THE DAY, WILL CONT TO MONITOR PATIENT AS PER ORDERS.

## 2023-02-09 NOTE — NUR
REPORT CALLED TO [facility] [phone] S/W HARITHA, PICK TIME IS 1700 WITH [facility], 
LEFT MESSAGE WITH DAUGHTER SONIYA [phone]

## 2023-02-09 NOTE — NUR
CLOSING NOTES:

PATIENT REMAINS STABLE TODAY  NO S/S OF ANY DISTRESS, ALL SAFETY CHECKS DONE THOUGHT THE 
DAY, BED AT LOW AND LOCKED POSITION CALL LIGHT IN REACH   WILL GIVE PM SHIFT NURSE BEDSIDE 
SBAR. .

## 2023-02-10 VITALS — SYSTOLIC BLOOD PRESSURE: 126 MMHG

## 2023-02-10 VITALS — SYSTOLIC BLOOD PRESSURE: 107 MMHG

## 2023-02-10 VITALS — SYSTOLIC BLOOD PRESSURE: 120 MMHG

## 2023-02-10 VITALS — SYSTOLIC BLOOD PRESSURE: 119 MMHG

## 2023-02-10 VITALS — SYSTOLIC BLOOD PRESSURE: 122 MMHG

## 2023-02-10 VITALS — SYSTOLIC BLOOD PRESSURE: 159 MMHG

## 2023-02-10 LAB
ALBUMIN SERPL BCP-MCNC: 3.1 G/DL (ref 3.4–4.8)
ALT SERPL W P-5'-P-CCNC: 25 U/L (ref 12–78)
ANION GAP SERPL CALCULATED.3IONS-SCNC: 9 MMOL/L (ref 5–15)
AST SERPL W P-5'-P-CCNC: 20 U/L (ref 10–37)
BASOPHILS # BLD AUTO: 0 K/UL (ref 0–0.2)
BASOPHILS NFR BLD AUTO: 0.3 % (ref 0–2)
BILIRUB SERPL-MCNC: 0.4 MG/DL (ref 0–1)
BUN SERPL-MCNC: 11 MG/DL (ref 8–21)
CALCIUM SERPL-MCNC: 9.2 MG/DL (ref 8.4–11)
CHLORIDE SERPL-SCNC: 105 MMOL/L (ref 98–107)
CREAT SERPL-MCNC: 0.7 MG/DL (ref 0.55–1.3)
EOSINOPHIL # BLD AUTO: 0.4 K/UL (ref 0–0.4)
EOSINOPHIL NFR BLD AUTO: 6.5 % (ref 0–4)
ERYTHROCYTE [DISTWIDTH] IN BLOOD BY AUTOMATED COUNT: 14.8 % (ref 9–15)
GFR SERPL CREATININE-BSD FRML MDRD: (no result) ML/MIN (ref 90–?)
GLUCOSE SERPL-MCNC: 119 MG/DL (ref 70–99)
HCT VFR BLD AUTO: 36.5 % (ref 36–48)
HGB BLD-MCNC: 12.2 G/DL (ref 12–16)
INR PPP: 1.1 (ref 0.8–1.2)
LYMPHOCYTES # BLD AUTO: 1 K/UL (ref 1–5.5)
LYMPHOCYTES NFR BLD AUTO: 16 % (ref 20.5–51.5)
MCH RBC QN AUTO: 29 PG (ref 27–31)
MCHC RBC AUTO-ENTMCNC: 33 % (ref 32–36)
MCV RBC AUTO: 87 FL (ref 79–98)
MONOCYTES # BLD MANUAL: 0.4 K/UL (ref 0–1)
MONOCYTES # BLD MANUAL: 6.4 % (ref 1.7–9.3)
NEUTROPHILS # BLD AUTO: 4.4 K/UL (ref 1.8–7.7)
NEUTROPHILS NFR BLD AUTO: 70.8 % (ref 40–70)
PLATELET # BLD AUTO: 124 K/UL (ref 130–430)
PROTHROMBIN TIME: 11.5 SECS (ref 9.5–12.5)
RBC # BLD AUTO: 4.19 MIL/UL (ref 4.2–6.2)
WBC # BLD AUTO: 6.2 K/UL (ref 4.8–10.8)

## 2023-02-10 PROCEDURE — 0DH63UZ INSERTION OF FEEDING DEVICE INTO STOMACH, PERCUTANEOUS APPROACH: ICD-10-PCS | Performed by: INTERNAL MEDICINE

## 2023-02-10 RX ADMIN — TIMOLOL MALEATE SCH ML: 5 SOLUTION/ DROPS OPHTHALMIC at 09:10

## 2023-02-10 RX ADMIN — Medication SCH MG: at 08:56

## 2023-02-10 RX ADMIN — BRIMONIDINE TARTRATE SCH ML: 2 SOLUTION/ DROPS OPHTHALMIC at 20:40

## 2023-02-10 RX ADMIN — DEXTROSE MONOHYDRATE SCH MLS/HR: 50 INJECTION, SOLUTION INTRAVENOUS at 23:30

## 2023-02-10 RX ADMIN — LEVALBUTEROL SCH MG: 1.25 SOLUTION, CONCENTRATE RESPIRATORY (INHALATION) at 07:29

## 2023-02-10 RX ADMIN — DOCUSATE SODIUM SCH MG: 100 CAPSULE, LIQUID FILLED ORAL at 08:56

## 2023-02-10 RX ADMIN — SODIUM CHLORIDE SCH MG: 9 INJECTION, SOLUTION INTRAVENOUS at 20:55

## 2023-02-10 RX ADMIN — LEVALBUTEROL SCH MG: 1.25 SOLUTION, CONCENTRATE RESPIRATORY (INHALATION) at 15:49

## 2023-02-10 RX ADMIN — MEGESTROL ACETATE SCH MG: 400 SUSPENSION ORAL at 20:56

## 2023-02-10 RX ADMIN — ENOXAPARIN SODIUM SCH MG: 30 INJECTION SUBCUTANEOUS at 20:55

## 2023-02-10 RX ADMIN — Medication SCH CAP: at 20:55

## 2023-02-10 RX ADMIN — Medication SCH GM: at 09:00

## 2023-02-10 RX ADMIN — SODIUM CHLORIDE SCH MG: 9 INJECTION, SOLUTION INTRAVENOUS at 09:09

## 2023-02-10 RX ADMIN — LEVALBUTEROL SCH MG: 1.25 SOLUTION, CONCENTRATE RESPIRATORY (INHALATION) at 22:58

## 2023-02-10 RX ADMIN — LACTULOSE SCH ML: 10 SOLUTION ORAL; RECTAL at 09:00

## 2023-02-10 RX ADMIN — Medication SCH CAP: at 09:12

## 2023-02-10 RX ADMIN — BRIMONIDINE TARTRATE SCH ML: 2 SOLUTION/ DROPS OPHTHALMIC at 09:10

## 2023-02-10 RX ADMIN — LEVOTHYROXINE SODIUM SCH MG: 50 TABLET ORAL at 05:52

## 2023-02-10 RX ADMIN — MEGESTROL ACETATE SCH MG: 400 SUSPENSION ORAL at 09:00

## 2023-02-10 RX ADMIN — DOCUSATE SODIUM SCH MG: 100 CAPSULE, LIQUID FILLED ORAL at 20:56

## 2023-02-10 RX ADMIN — AMITRIPTYLINE HYDROCHLORIDE SCH MG: 10 TABLET, FILM COATED ORAL at 20:56

## 2023-02-10 RX ADMIN — POTASSIUM CHLORIDE AND DEXTROSE MONOHYDRATE SCH MLS/HR: 150; 5 INJECTION, SOLUTION INTRAVENOUS at 05:52

## 2023-02-10 RX ADMIN — PRAMIPEXOLE DIHYDROCHLORIDE SCH MG: 0.25 TABLET ORAL at 20:56

## 2023-02-10 RX ADMIN — LATANOPROST SCH ML: 50 SOLUTION OPHTHALMIC at 20:44

## 2023-02-10 RX ADMIN — TIMOLOL MALEATE SCH ML: 5 SOLUTION/ DROPS OPHTHALMIC at 20:40

## 2023-02-11 VITALS — SYSTOLIC BLOOD PRESSURE: 98 MMHG

## 2023-02-11 VITALS — SYSTOLIC BLOOD PRESSURE: 121 MMHG

## 2023-02-11 VITALS — SYSTOLIC BLOOD PRESSURE: 109 MMHG

## 2023-02-11 VITALS — SYSTOLIC BLOOD PRESSURE: 123 MMHG

## 2023-02-11 VITALS — SYSTOLIC BLOOD PRESSURE: 122 MMHG

## 2023-02-11 LAB
ANION GAP SERPL CALCULATED.3IONS-SCNC: 10 MMOL/L (ref 5–15)
BASOPHILS NFR BLD MANUAL: 1 % (ref 0–2)
BUN SERPL-MCNC: 10 MG/DL (ref 8–21)
CALCIUM SERPL-MCNC: 9.3 MG/DL (ref 8.4–11)
CHLORIDE SERPL-SCNC: 104 MMOL/L (ref 98–107)
CREAT SERPL-MCNC: 0.79 MG/DL (ref 0.55–1.3)
EOSINOPHIL NFR BLD MANUAL: 2 % (ref 0–7)
ERYTHROCYTE [DISTWIDTH] IN BLOOD BY AUTOMATED COUNT: 14.8 % (ref 9–15)
GFR SERPL CREATININE-BSD FRML MDRD: (no result) ML/MIN (ref 90–?)
GLUCOSE SERPL-MCNC: 113 MG/DL (ref 70–99)
HCT VFR BLD AUTO: 37.9 % (ref 36–48)
HGB BLD-MCNC: 12.7 G/DL (ref 12–16)
LYMPHOCYTES NFR BLD MANUAL: 5 % (ref 20–46)
MCH RBC QN AUTO: 29 PG (ref 27–31)
MCHC RBC AUTO-ENTMCNC: 34 % (ref 32–36)
MCV RBC AUTO: 87 FL (ref 79–98)
MONOCYTES # BLD MANUAL: 3 % (ref 0–11)
NEUTS BAND NFR BLD MANUAL: 1 % (ref 0–6)
PLATELET # BLD AUTO: 135 K/UL (ref 130–430)
RBC # BLD AUTO: 4.37 MIL/UL (ref 4.2–6.2)
WBC # BLD AUTO: 9.8 K/UL (ref 4.8–10.8)

## 2023-02-11 RX ADMIN — LEVOTHYROXINE SODIUM SCH MG: 50 TABLET ORAL at 06:31

## 2023-02-11 RX ADMIN — DEXTROSE MONOHYDRATE SCH MLS/HR: 50 INJECTION, SOLUTION INTRAVENOUS at 23:05

## 2023-02-11 RX ADMIN — Medication SCH MG: at 08:48

## 2023-02-11 RX ADMIN — LACTULOSE SCH ML: 10 SOLUTION ORAL; RECTAL at 08:47

## 2023-02-11 RX ADMIN — TIMOLOL MALEATE SCH ML: 5 SOLUTION/ DROPS OPHTHALMIC at 08:49

## 2023-02-11 RX ADMIN — ENOXAPARIN SODIUM SCH MG: 30 INJECTION SUBCUTANEOUS at 21:28

## 2023-02-11 RX ADMIN — Medication SCH CAP: at 21:27

## 2023-02-11 RX ADMIN — DOCUSATE SODIUM SCH MG: 100 CAPSULE, LIQUID FILLED ORAL at 08:47

## 2023-02-11 RX ADMIN — AMITRIPTYLINE HYDROCHLORIDE SCH MG: 10 TABLET, FILM COATED ORAL at 21:38

## 2023-02-11 RX ADMIN — MEGESTROL ACETATE SCH MG: 400 SUSPENSION ORAL at 21:25

## 2023-02-11 RX ADMIN — Medication SCH GM: at 08:48

## 2023-02-11 RX ADMIN — BRIMONIDINE TARTRATE SCH ML: 2 SOLUTION/ DROPS OPHTHALMIC at 21:29

## 2023-02-11 RX ADMIN — DOCUSATE SODIUM SCH MG: 100 CAPSULE, LIQUID FILLED ORAL at 21:27

## 2023-02-11 RX ADMIN — BRIMONIDINE TARTRATE SCH ML: 2 SOLUTION/ DROPS OPHTHALMIC at 08:49

## 2023-02-11 RX ADMIN — Medication SCH CAP: at 08:47

## 2023-02-11 RX ADMIN — POTASSIUM CHLORIDE AND DEXTROSE MONOHYDRATE SCH MLS/HR: 150; 5 INJECTION, SOLUTION INTRAVENOUS at 01:03

## 2023-02-11 RX ADMIN — SODIUM CHLORIDE SCH MLS/HR: 9 INJECTION, SOLUTION INTRAVENOUS at 15:00

## 2023-02-11 RX ADMIN — TIMOLOL MALEATE SCH ML: 5 SOLUTION/ DROPS OPHTHALMIC at 21:29

## 2023-02-11 RX ADMIN — LEVALBUTEROL SCH MG: 1.25 SOLUTION, CONCENTRATE RESPIRATORY (INHALATION) at 23:33

## 2023-02-11 RX ADMIN — LATANOPROST SCH ML: 50 SOLUTION OPHTHALMIC at 21:30

## 2023-02-11 RX ADMIN — LEVALBUTEROL SCH MG: 1.25 SOLUTION, CONCENTRATE RESPIRATORY (INHALATION) at 15:20

## 2023-02-11 RX ADMIN — SODIUM CHLORIDE SCH MG: 9 INJECTION, SOLUTION INTRAVENOUS at 08:48

## 2023-02-11 RX ADMIN — PRAMIPEXOLE DIHYDROCHLORIDE SCH MG: 0.25 TABLET ORAL at 21:38

## 2023-02-11 RX ADMIN — SODIUM CHLORIDE SCH MG: 9 INJECTION, SOLUTION INTRAVENOUS at 21:27

## 2023-02-11 RX ADMIN — MEGESTROL ACETATE SCH MG: 400 SUSPENSION ORAL at 08:47

## 2023-02-11 RX ADMIN — LEVALBUTEROL SCH MG: 1.25 SOLUTION, CONCENTRATE RESPIRATORY (INHALATION) at 07:15

## 2023-02-11 NOTE — NUR
PT HAS BEEN STABLE, PT IS MORE ALERT, INTERACTING WITH HER DAUGHTER THIS PM. NEW ORDER FOR 
S.T FOR LANGUAGE EVALUATION.

## 2023-02-12 VITALS — SYSTOLIC BLOOD PRESSURE: 129 MMHG

## 2023-02-12 VITALS — SYSTOLIC BLOOD PRESSURE: 109 MMHG

## 2023-02-12 VITALS — SYSTOLIC BLOOD PRESSURE: 135 MMHG

## 2023-02-12 VITALS — SYSTOLIC BLOOD PRESSURE: 128 MMHG

## 2023-02-12 VITALS — SYSTOLIC BLOOD PRESSURE: 138 MMHG

## 2023-02-12 LAB
ANION GAP SERPL CALCULATED.3IONS-SCNC: 10 MMOL/L (ref 5–15)
BASOPHILS # BLD AUTO: 0 K/UL (ref 0–0.2)
BASOPHILS NFR BLD AUTO: 0.1 % (ref 0–2)
BUN SERPL-MCNC: 15 MG/DL (ref 8–21)
CALCIUM SERPL-MCNC: 9 MG/DL (ref 8.4–11)
CHLORIDE SERPL-SCNC: 103 MMOL/L (ref 98–107)
CREAT SERPL-MCNC: 0.7 MG/DL (ref 0.55–1.3)
EOSINOPHIL # BLD AUTO: 0.2 K/UL (ref 0–0.4)
EOSINOPHIL NFR BLD AUTO: 1.9 % (ref 0–4)
ERYTHROCYTE [DISTWIDTH] IN BLOOD BY AUTOMATED COUNT: 14.7 % (ref 9–15)
GFR SERPL CREATININE-BSD FRML MDRD: (no result) ML/MIN (ref 90–?)
GLUCOSE SERPL-MCNC: 125 MG/DL (ref 70–99)
HCT VFR BLD AUTO: 35 % (ref 36–48)
HGB BLD-MCNC: 11.9 G/DL (ref 12–16)
LYMPHOCYTES # BLD AUTO: 0.5 K/UL (ref 1–5.5)
LYMPHOCYTES NFR BLD AUTO: 5.8 % (ref 20.5–51.5)
MCH RBC QN AUTO: 30 PG (ref 27–31)
MCHC RBC AUTO-ENTMCNC: 34 % (ref 32–36)
MCV RBC AUTO: 87 FL (ref 79–98)
MONOCYTES # BLD MANUAL: 0.6 K/UL (ref 0–1)
MONOCYTES # BLD MANUAL: 6 % (ref 1.7–9.3)
NEUTROPHILS # BLD AUTO: 8 K/UL (ref 1.8–7.7)
NEUTROPHILS NFR BLD AUTO: 86.2 % (ref 40–70)
PLATELET # BLD AUTO: 128 K/UL (ref 130–430)
RBC # BLD AUTO: 4.03 MIL/UL (ref 4.2–6.2)
WBC # BLD AUTO: 9.2 K/UL (ref 4.8–10.8)

## 2023-02-12 RX ADMIN — AMITRIPTYLINE HYDROCHLORIDE SCH MG: 10 TABLET, FILM COATED ORAL at 22:11

## 2023-02-12 RX ADMIN — BRIMONIDINE TARTRATE SCH ML: 2 SOLUTION/ DROPS OPHTHALMIC at 09:32

## 2023-02-12 RX ADMIN — Medication SCH CAP: at 22:11

## 2023-02-12 RX ADMIN — LEVALBUTEROL SCH MG: 1.25 SOLUTION, CONCENTRATE RESPIRATORY (INHALATION) at 23:16

## 2023-02-12 RX ADMIN — Medication SCH GM: at 09:33

## 2023-02-12 RX ADMIN — LEVOTHYROXINE SODIUM SCH MG: 50 TABLET ORAL at 06:32

## 2023-02-12 RX ADMIN — TIMOLOL MALEATE SCH ML: 5 SOLUTION/ DROPS OPHTHALMIC at 09:32

## 2023-02-12 RX ADMIN — LATANOPROST SCH ML: 50 SOLUTION OPHTHALMIC at 22:12

## 2023-02-12 RX ADMIN — TIMOLOL MALEATE SCH ML: 5 SOLUTION/ DROPS OPHTHALMIC at 22:12

## 2023-02-12 RX ADMIN — PRAMIPEXOLE DIHYDROCHLORIDE SCH MG: 0.25 TABLET ORAL at 22:11

## 2023-02-12 RX ADMIN — DOCUSATE SODIUM SCH MG: 100 CAPSULE, LIQUID FILLED ORAL at 09:33

## 2023-02-12 RX ADMIN — MEGESTROL ACETATE SCH MG: 400 SUSPENSION ORAL at 09:33

## 2023-02-12 RX ADMIN — MEGESTROL ACETATE SCH MG: 400 SUSPENSION ORAL at 22:09

## 2023-02-12 RX ADMIN — LACTULOSE SCH ML: 10 SOLUTION ORAL; RECTAL at 09:33

## 2023-02-12 RX ADMIN — LEVALBUTEROL SCH MG: 1.25 SOLUTION, CONCENTRATE RESPIRATORY (INHALATION) at 15:24

## 2023-02-12 RX ADMIN — SODIUM CHLORIDE SCH MG: 9 INJECTION, SOLUTION INTRAVENOUS at 09:34

## 2023-02-12 RX ADMIN — BRIMONIDINE TARTRATE SCH ML: 2 SOLUTION/ DROPS OPHTHALMIC at 22:12

## 2023-02-12 RX ADMIN — Medication SCH CAP: at 09:33

## 2023-02-12 RX ADMIN — ENOXAPARIN SODIUM SCH MG: 30 INJECTION SUBCUTANEOUS at 22:09

## 2023-02-12 RX ADMIN — DOCUSATE SODIUM SCH MG: 100 CAPSULE, LIQUID FILLED ORAL at 22:09

## 2023-02-12 RX ADMIN — LEVALBUTEROL SCH MG: 1.25 SOLUTION, CONCENTRATE RESPIRATORY (INHALATION) at 07:17

## 2023-02-12 RX ADMIN — Medication SCH MG: at 09:33

## 2023-02-12 RX ADMIN — SODIUM CHLORIDE SCH MG: 9 INJECTION, SOLUTION INTRAVENOUS at 22:10

## 2023-02-12 NOTE — NUR
CM: informed CN/Katarzyna, pt can go back to [facility] today. Room 15,  Transportation with 
[facility].  [phone], RN please arrange ambulance once has final dc from MD.

## 2023-02-12 NOTE — NUR
DR AMOS COVERING TO DR WISEMAN MADE AWARE RE-BED AVAILABILITY IN [facility]

       PER DR AMOS, DR WISEMAN TO DISCHARGE PATIENT TOMORROW, DAUGHTER NEEDS TO TALK TO DR WISEMAN RE-DISCHARGE.

## 2023-02-12 NOTE — NUR
Opening notes



Pt awake, alert, no s/s distress noted.  VSS.  Pt denies pain at this time when asked.  IVF 
infusing at ordered rate 30cc/hr L. FA 22G clear and patent.  GT feeding on hold at this 
time, residual 0.  Mccord catheter draining to gravity with clear, yellow urine.  Call light 
within reach.  Bed low, locked, siderails up x4, alarm on.

## 2023-02-12 NOTE — NUR
Closing notes



Pt awake, alert, confused, no s/s distress noted.  IVF infusing at ordered rate R. AC no s/s 
infiltration.  GT feeding Jevity 1.2 at 60cc/hr goal rate, pt tolerating well.  Pt had a BM. 
 Bed low, locked, siderails up x4, alarm on.  HOB maintained elevated.  Pt repositioned.  To 
endorse to AM nurse.

## 2023-02-12 NOTE — NUR
Dietitian Recommendations 



* Continue Jevity 1.2 @ 60mL/hr(goal) via GT

Provides: 1728 kcal, 80 g pro, 1162 mL free water 

Meets: 107% lower est kcal, 105% upper est PRO, 73% of lower est fluid needs

* Ordered: FWF of 150mL q6h

* Nursing, please swab pt's mouth every hour or 2



 GS, MPH, RD



Please refer to Nutrition F/U for further details. Thanks!

## 2023-02-12 NOTE — NUR
Nutrition F/U



RD reviewed pts current EMR including diet hx, physician notes, nursing notes, pertinent 
labs/meds/procedures, care trends and care activity.



Subjective Information 

RD rounded to pt room but pt was sleeping deeply at the time. RD found and s/w RN about pt 
condition. He attested to her having new GT and that she seems to be tolerating feedings 
well. RD witnessed Jevity 1.2 running at 60 mL/hr (goal). RN said pt had 2 soft BM today. He 
attested that she is on Lactulose and miralax and they will DC if pt begins to have D. Per 
EMR review: pt abd is soft, non-distended w/active bowel sounds; GRV low (30 mL on 2/12).  
Pt is likely meeting nutritional needs at this time. 



Current Diet Order/Nutrition Support 

Jevity 1.2 @ 60mL/hr (goal),  Q8H via GT

Provides: 1728 kcal, 80 g pro, 1462 mL free water (inc FWF)

Meets: 107% lower est kcal, 105% upper est PRO, 91% of lower est fluid needs



% PO intake 

NPO



Last BM  

 2/12 x 1



NEW Estimated Energy Expenditure (kcals/day) 

4509-1381 kcal (25-30 kcal/kg IBW underweight)

NEW Estimated Protein Required (g/day) 

43-76 g (.8-1.4g/kg IBW d/t underweight GERIAT)

Estimated Fluid Required (l/day) 

1.6-1.8L (1ml/kcal maintenance)



Problem/Etiology/Signs/Symptoms 

* Inadequate oral intake r/t diet order AEB NPO per SLP (Resolved) 

Expected Outcomes/Goals 

PO intake provides >85% estimated nutrient needs, nutrition-related labs 

trending WNL, improvements in skin integrity, BM q1-3 days

Dietitian Recommendations 

* Continue Jevity 1.2 @ 60mL/hr(goal) via GT

Provides: 1728 kcal, 80 g pro, 1162 mL free water 

Meets: 107% lower est kcal, 105% upper est PRO, 73% of lower est fluid needs

* Ordered:  FWF of 150mL q6h

* Nursing, please swab pt's mouth every hour or 2



Follow up

Mod risk:  see pt in 3-5 days



GS, MPH, RD

## 2023-02-12 NOTE — NUR
PAGED DR AMOS FOR TYLENOL ORDER, OFFERED TO PT NORCO BUT DAUGHTER AT BEDSIDE SAID IT 
CAUSES PT TO BE SEDATED. SHE SAID SHE PREFER TYLENOL. DAUGHTER ALSO SAID THAT PT IS 
COMPLAINING OF CRAMPING ABDOMEN. RESIDUAL WAS 30 - 40CC, HELD FEEDING PER PT'S DAUGHTER.  
ENDORSED TO NIGHT NIGHT.

## 2023-02-12 NOTE — NUR
G-tube/Med pass

Pt repositioned, HOB elevated.  GTube feeding Jevity 1.2 resumed, no residual noted.  Meds 
passed via GT and water flush given as ordered.  To monitor.

## 2023-02-13 VITALS — SYSTOLIC BLOOD PRESSURE: 143 MMHG

## 2023-02-13 VITALS — SYSTOLIC BLOOD PRESSURE: 134 MMHG

## 2023-02-13 VITALS — SYSTOLIC BLOOD PRESSURE: 125 MMHG

## 2023-02-13 VITALS — SYSTOLIC BLOOD PRESSURE: 132 MMHG

## 2023-02-13 VITALS — SYSTOLIC BLOOD PRESSURE: 149 MMHG

## 2023-02-13 RX ADMIN — MEGESTROL ACETATE SCH MG: 400 SUSPENSION ORAL at 09:38

## 2023-02-13 RX ADMIN — Medication SCH CAP: at 09:38

## 2023-02-13 RX ADMIN — DOCUSATE SODIUM SCH MG: 100 CAPSULE, LIQUID FILLED ORAL at 20:56

## 2023-02-13 RX ADMIN — AMITRIPTYLINE HYDROCHLORIDE SCH MG: 10 TABLET, FILM COATED ORAL at 21:18

## 2023-02-13 RX ADMIN — MEGESTROL ACETATE SCH MG: 400 SUSPENSION ORAL at 20:56

## 2023-02-13 RX ADMIN — TIMOLOL MALEATE SCH ML: 5 SOLUTION/ DROPS OPHTHALMIC at 09:39

## 2023-02-13 RX ADMIN — PRAMIPEXOLE DIHYDROCHLORIDE SCH MG: 0.25 TABLET ORAL at 21:18

## 2023-02-13 RX ADMIN — LEVALBUTEROL SCH MG: 1.25 SOLUTION, CONCENTRATE RESPIRATORY (INHALATION) at 07:22

## 2023-02-13 RX ADMIN — SODIUM CHLORIDE SCH MLS/HR: 9 INJECTION, SOLUTION INTRAVENOUS at 15:00

## 2023-02-13 RX ADMIN — Medication SCH CAP: at 20:56

## 2023-02-13 RX ADMIN — LATANOPROST SCH ML: 50 SOLUTION OPHTHALMIC at 20:58

## 2023-02-13 RX ADMIN — TIMOLOL MALEATE SCH ML: 5 SOLUTION/ DROPS OPHTHALMIC at 20:58

## 2023-02-13 RX ADMIN — BRIMONIDINE TARTRATE SCH ML: 2 SOLUTION/ DROPS OPHTHALMIC at 20:59

## 2023-02-13 RX ADMIN — DOCUSATE SODIUM SCH MG: 100 CAPSULE, LIQUID FILLED ORAL at 09:39

## 2023-02-13 RX ADMIN — SODIUM CHLORIDE SCH MG: 9 INJECTION, SOLUTION INTRAVENOUS at 09:39

## 2023-02-13 RX ADMIN — SODIUM CHLORIDE SCH MLS/HR: 9 INJECTION, SOLUTION INTRAVENOUS at 03:57

## 2023-02-13 RX ADMIN — BRIMONIDINE TARTRATE SCH ML: 2 SOLUTION/ DROPS OPHTHALMIC at 09:40

## 2023-02-13 RX ADMIN — ENOXAPARIN SODIUM SCH MG: 30 INJECTION SUBCUTANEOUS at 20:57

## 2023-02-13 RX ADMIN — LEVALBUTEROL SCH MG: 1.25 SOLUTION, CONCENTRATE RESPIRATORY (INHALATION) at 14:37

## 2023-02-13 RX ADMIN — SODIUM CHLORIDE SCH MG: 9 INJECTION, SOLUTION INTRAVENOUS at 20:57

## 2023-02-13 NOTE — NUR
PT DISCHARGED

Report given to EMT at 2130. Transfer packet with Transfer Orders and Medication 
Reconciliation form given to EMT with report. Exitcare provided. [facility] ID band removed, 
replaced with ID band with pt's name and .  IV catheter removed, intact and dressing 
applied, no active bleeding. All belongings sent with patient. Patient left floor via gurney 
escorted by EMT in no distress.

## 2023-02-13 NOTE — NUR
Patient resting comfortably in bed with no distress noted. Stopped G-tube feeding: patient 
to be picked up at 1700 today.

## 2023-02-13 NOTE — NUR
Closing notes



Pt awake, alert, no s/s distress noted.  IVF infusing at ordered rate 30cc/hr L. FA 22G 
clear and patent.  GT feeding Jevity running at 60cc/hr.  Pt tolerating well.  Pt had 2 
bowel movement.  Mccord catheter draining to gravity with clear, yellow urine.  Call light 
within reach.  Bed low, locked, siderails up x4, alarm on.

## 2023-02-13 NOTE — NUR
Received call from Yaritza (dtr). Informed that patient will be transported back to [facility] at 1930 today. Caller voiced concern for ambulance co-pay. Gave ph# to [facility] 
[phone].

## 2023-02-13 NOTE — NUR
Called report to Lady BALDERAS RN at [facility] [phone]. Patient will go to Room 15. 
Accepting doctor is Dr. Pederson.

## 2023-02-13 NOTE — NUR
Mccord catheter removed per order. Emptied 100mls of clear, yellow urine from drainage bag. 
Patient stable.

## 2023-02-13 NOTE — NUR
CM: per dr. Michaels: 

Plan

speech eval pending

discussed in detail with daughter

d/c to snf after eval

## 2023-02-13 NOTE — NUR
ST EVALUATION COMPLETED.  ST TX NOT INDICATED AT THIS TIME.  RECOMMEND PO ORAL GRATIFICATION 
DIET OF PUREE/HONEY THICK LIQUIDS AND ENTERAL FEEDING AS ORDERED.

## 2023-02-16 ENCOUNTER — HOSPITAL ENCOUNTER (INPATIENT)
Dept: HOSPITAL 4 - SED | Age: 88
LOS: 4 days | Discharge: NURSING, REHAB OR LONG-TERM CARE | DRG: 871 | End: 2023-02-20
Attending: FAMILY MEDICINE | Admitting: FAMILY MEDICINE
Payer: COMMERCIAL

## 2023-02-16 VITALS — SYSTOLIC BLOOD PRESSURE: 135 MMHG

## 2023-02-16 VITALS — HEIGHT: 60 IN | BODY MASS INDEX: 19.44 KG/M2 | WEIGHT: 99 LBS

## 2023-02-16 VITALS — SYSTOLIC BLOOD PRESSURE: 125 MMHG

## 2023-02-16 VITALS — SYSTOLIC BLOOD PRESSURE: 147 MMHG

## 2023-02-16 VITALS — SYSTOLIC BLOOD PRESSURE: 126 MMHG

## 2023-02-16 VITALS — SYSTOLIC BLOOD PRESSURE: 132 MMHG

## 2023-02-16 DIAGNOSIS — I50.9: ICD-10-CM

## 2023-02-16 DIAGNOSIS — G62.9: ICD-10-CM

## 2023-02-16 DIAGNOSIS — K21.9: ICD-10-CM

## 2023-02-16 DIAGNOSIS — F03.90: ICD-10-CM

## 2023-02-16 DIAGNOSIS — Z86.73: ICD-10-CM

## 2023-02-16 DIAGNOSIS — K44.9: ICD-10-CM

## 2023-02-16 DIAGNOSIS — D64.9: ICD-10-CM

## 2023-02-16 DIAGNOSIS — J18.9: ICD-10-CM

## 2023-02-16 DIAGNOSIS — E03.9: ICD-10-CM

## 2023-02-16 DIAGNOSIS — I11.0: ICD-10-CM

## 2023-02-16 DIAGNOSIS — K56.41: ICD-10-CM

## 2023-02-16 DIAGNOSIS — E87.1: ICD-10-CM

## 2023-02-16 DIAGNOSIS — A41.9: Primary | ICD-10-CM

## 2023-02-16 DIAGNOSIS — R13.10: ICD-10-CM

## 2023-02-16 DIAGNOSIS — N39.0: ICD-10-CM

## 2023-02-16 DIAGNOSIS — E43: ICD-10-CM

## 2023-02-16 DIAGNOSIS — E88.09: ICD-10-CM

## 2023-02-16 DIAGNOSIS — K65.9: ICD-10-CM

## 2023-02-16 DIAGNOSIS — I73.9: ICD-10-CM

## 2023-02-16 DIAGNOSIS — R79.89: ICD-10-CM

## 2023-02-16 DIAGNOSIS — K94.23: ICD-10-CM

## 2023-02-16 DIAGNOSIS — Z20.822: ICD-10-CM

## 2023-02-16 DIAGNOSIS — E87.20: ICD-10-CM

## 2023-02-16 LAB
ALBUMIN SERPL BCP-MCNC: 3.2 G/DL (ref 3.4–4.8)
ALT SERPL W P-5'-P-CCNC: 18 U/L (ref 12–78)
ANION GAP SERPL CALCULATED.3IONS-SCNC: 7 MMOL/L (ref 5–15)
APPEARANCE UR: (no result)
AST SERPL W P-5'-P-CCNC: 15 U/L (ref 10–37)
BACTERIA URNS QL MICRO: (no result) /HPF
BASOPHILS # BLD AUTO: 0 K/UL (ref 0–0.2)
BASOPHILS NFR BLD AUTO: 0.4 % (ref 0–2)
BILIRUB SERPL-MCNC: 0.3 MG/DL (ref 0–1)
BILIRUB UR QL STRIP: NEGATIVE
BUN SERPL-MCNC: 50 MG/DL (ref 8–21)
CALCIUM SERPL-MCNC: 9.6 MG/DL (ref 8.4–11)
CHLORIDE SERPL-SCNC: 98 MMOL/L (ref 98–107)
COLOR UR: YELLOW
CREAT SERPL-MCNC: 0.74 MG/DL (ref 0.55–1.3)
EOSINOPHIL # BLD AUTO: 0.1 K/UL (ref 0–0.4)
EOSINOPHIL NFR BLD AUTO: 1.1 % (ref 0–4)
ERYTHROCYTE [DISTWIDTH] IN BLOOD BY AUTOMATED COUNT: 15.3 % (ref 9–15)
GFR SERPL CREATININE-BSD FRML MDRD: (no result) ML/MIN (ref 90–?)
GLUCOSE SERPL-MCNC: 115 MG/DL (ref 70–99)
GLUCOSE UR STRIP-MCNC: NEGATIVE MG/DL
HCT VFR BLD AUTO: 38.3 % (ref 36–48)
HGB BLD-MCNC: 12.8 G/DL (ref 12–16)
HGB UR QL STRIP: (no result)
KETONES UR STRIP-MCNC: NEGATIVE MG/DL
LEUKOCYTE ESTERASE UR QL STRIP: (no result)
LIPASE SERPL-CCNC: 85 U/L (ref 73–393)
LYMPHOCYTES # BLD AUTO: 0.7 K/UL (ref 1–5.5)
LYMPHOCYTES NFR BLD AUTO: 5.7 % (ref 20.5–51.5)
MCH RBC QN AUTO: 29 PG (ref 27–31)
MCHC RBC AUTO-ENTMCNC: 34 % (ref 32–36)
MCV RBC AUTO: 86 FL (ref 79–98)
MONOCYTES # BLD MANUAL: 1.2 K/UL (ref 0–1)
MONOCYTES # BLD MANUAL: 9.8 % (ref 1.7–9.3)
NEUTROPHILS # BLD AUTO: 10.1 K/UL (ref 1.8–7.7)
NEUTROPHILS NFR BLD AUTO: 83 % (ref 40–70)
NITRITE UR QL STRIP: NEGATIVE
PH UR STRIP: 6 [PH] (ref 5–8)
PLATELET # BLD AUTO: 292 K/UL (ref 130–430)
PROT UR QL STRIP: (no result)
RBC # BLD AUTO: 4.44 MIL/UL (ref 4.2–6.2)
RBC #/AREA URNS HPF: (no result) /HPF (ref 0–3)
SP GR UR STRIP: 1.02 (ref 1–1.03)
UROBILINOGEN UR STRIP-MCNC: 0.2 MG/DL (ref 0.2–1)
WBC # BLD AUTO: 12.1 K/UL (ref 4.8–10.8)
WBC #/AREA URNS HPF: >100 /HPF (ref 0–3)

## 2023-02-16 PROCEDURE — C9113 INJ PANTOPRAZOLE SODIUM, VIA: HCPCS

## 2023-02-16 RX ADMIN — LACTULOSE SCH ML: 10 SOLUTION ORAL; RECTAL at 18:03

## 2023-02-16 RX ADMIN — MORPHINE SULFATE PRN MG: 2 INJECTION, SOLUTION INTRAMUSCULAR; INTRAVENOUS at 04:54

## 2023-02-16 RX ADMIN — AMITRIPTYLINE HYDROCHLORIDE SCH MG: 10 TABLET, FILM COATED ORAL at 20:44

## 2023-02-16 RX ADMIN — LATANOPROST SCH ML: 50 SOLUTION OPHTHALMIC at 21:00

## 2023-02-16 RX ADMIN — DEXTROSE AND SODIUM CHLORIDE SCH MLS/HR: 5; 450 INJECTION, SOLUTION INTRAVENOUS at 15:16

## 2023-02-16 RX ADMIN — TIMOLOL MALEATE SCH ML: 5 SOLUTION/ DROPS OPHTHALMIC at 21:00

## 2023-02-16 RX ADMIN — PRAMIPEXOLE DIHYDROCHLORIDE SCH MG: 1 TABLET ORAL at 21:00

## 2023-02-16 RX ADMIN — LACTULOSE SCH ML: 10 SOLUTION ORAL; RECTAL at 20:45

## 2023-02-16 RX ADMIN — CEFTRIAXONE SODIUM SCH MLS/HR: 1 INJECTION, SOLUTION INTRAVENOUS at 12:59

## 2023-02-16 RX ADMIN — Medication SCH CAP: at 20:44

## 2023-02-16 RX ADMIN — DEXTROSE AND SODIUM CHLORIDE SCH MLS/HR: 5; 450 INJECTION, SOLUTION INTRAVENOUS at 04:30

## 2023-02-16 RX ADMIN — METRONIDAZOLE SCH MLS/HR: 500 SOLUTION INTRAVENOUS at 15:15

## 2023-02-16 RX ADMIN — BACLOFEN SCH MG: 10 TABLET ORAL at 20:42

## 2023-02-16 RX ADMIN — MORPHINE SULFATE PRN MG: 2 INJECTION, SOLUTION INTRAMUSCULAR; INTRAVENOUS at 12:59

## 2023-02-16 NOTE — NUR
CLOSING NOTES

ALL NEEDS MET THROUGHOUT SHIFT. SAFETY CHECKS DONE. PATIENT HAD A BOWEL MOVEMENT BUT ONLY A 
SMEAR OF BROWN, FORMED STOOL. SAFETY CHECKS DONE. CALL LIGHT WITHIN REACH.

## 2023-02-16 NOTE — NUR
MD ROUNDS

DR. GOODWIN CAME IN AND SAW PATIENT. ORDERED AN UPPER GI TO CHECK TUBE PLACEMENT. WILL WAIT FOR 
RESULTS BEFORE GIVING MED.

## 2023-02-16 NOTE — NUR
Resting quietly, even and non-labored respirations, NAD. D5 1/2 NS continues to 
infuse to LAC with no s/s infiltration.

## 2023-02-16 NOTE — NUR
Patient will be admitted to [facility].  Admitted 121 A unit.  Will go to 
room .  Belongings list completed.  Complete and up to date summary report 
printed. SBAR report to be given at bedside with opportunity for questions.

## 2023-02-16 NOTE — NUR
#14 Fr. In/Out urinary catheterization performed with ~ 50 mL hazy straw 
colored urine return. Specimen collected and sent to lab. Pt tolerated well.

## 2023-02-16 NOTE — NUR
Call back from Dr. Sampson. New orders received to give pt Morphine 1 mg IVP q 
4 PRN for moderate pain and Morphine 2 mg IVP q 4 PRN for severe pain.

## 2023-02-16 NOTE — NUR
CONSULTATION PAGED/CALLED

Reason for Consultation: POSSIBLE PERFORATED VISCUS

Person Who was Notified: BRIANNA

Consulting Physician: DR GOODWIN 

Consultant Specialty: SURGERY

Ordering Physician: JOHNSON CARROLL

## 2023-02-16 NOTE — NUR
Admit bed requested 



Patient will be admitted to care of FARA Valle  

Admitted to Med/Surg unit.

Diagnosis GI Bleed, UTI

Inpatient (Yes or No)  Yes

Observation (Yes or No) No

Orientation concerns or request close to nursing station  (Yes or No) Yes

Covid Status Negaive

On vent or bipap No

Isolation requirements No

Needs a sitter No

From Home (Yes or if No enter name of facility) No - [facility]

Requires Dialysis (Yes or No) No

Med Rec Completed (Yes of No) Yes

## 2023-02-16 NOTE — NUR
Medication reconciliation completed with information provided by [facility]. 
Any prior medication reconciliation on file was reviewed and corrected.

## 2023-02-16 NOTE — NUR
Pt report received. Pt sent from [facility] via [facility] r/t c/o coffee ground 
emesis with LUQ abdominal pain since yesterday evening. Pt had PEG tube placed 
on 02/13/2023 and pt localizes pain to PEG tube site. No active vomiting noted 
at this time.

## 2023-02-16 NOTE — NUR
Placed in room 5  . Placed on cardiac monitor, blood pressure machine and pulse 
oximeter. To gown for exam. Side rails up.

Report given to Bacilio MURILLO.

## 2023-02-16 NOTE — NUR
MD PHONE CALL

CLARIFIED FEEDING ORDER WITH DR. GOODWIN. HE SAID TO HOLD FEEDING FOR NOW AND TO WAIT TILL HE 
SEES PATIENT JOSIE.

## 2023-02-16 NOTE — NUR
PAIN

PAIN MED ADMINISTERED PER REQUEST. DR. DELGADILLO PLACED ORDER FOR FEEDING BUT WILL WAIT FOR DR. GOODWIN TO SEE PATIENT FIRST. ENULOSE GIVEN. SAFETY CHECKS DONE. CALL LIGHT WITHIN REACH.

## 2023-02-16 NOTE — NUR
ADMIT NOTE

Received pt from ER to the floor with a diagnosis of GI bleed and UTI. Patient is alert to 
name and place only at this time. No shortness of breath. Complained of abdominal pain but 
is controlled per patient. Explained NPO; verbalized understanding. Encouraged to call 
anytime for help.

## 2023-02-17 VITALS — SYSTOLIC BLOOD PRESSURE: 117 MMHG

## 2023-02-17 VITALS — SYSTOLIC BLOOD PRESSURE: 93 MMHG

## 2023-02-17 VITALS — SYSTOLIC BLOOD PRESSURE: 126 MMHG

## 2023-02-17 LAB
ALBUMIN SERPL BCP-MCNC: 2.4 G/DL (ref 3.4–4.8)
ALT SERPL W P-5'-P-CCNC: 14 U/L (ref 12–78)
ANION GAP SERPL CALCULATED.3IONS-SCNC: 8 MMOL/L (ref 5–15)
AST SERPL W P-5'-P-CCNC: 20 U/L (ref 10–37)
BASOPHILS # BLD AUTO: 0.1 K/UL (ref 0–0.2)
BASOPHILS NFR BLD AUTO: 0.7 % (ref 0–2)
BILIRUB SERPL-MCNC: 0.2 MG/DL (ref 0–1)
BUN SERPL-MCNC: 27 MG/DL (ref 8–21)
CALCIUM SERPL-MCNC: 8.8 MG/DL (ref 8.4–11)
CHLORIDE SERPL-SCNC: 104 MMOL/L (ref 98–107)
CREAT SERPL-MCNC: 0.71 MG/DL (ref 0.55–1.3)
EOSINOPHIL # BLD AUTO: 0.6 K/UL (ref 0–0.4)
EOSINOPHIL NFR BLD AUTO: 5.7 % (ref 0–4)
ERYTHROCYTE [DISTWIDTH] IN BLOOD BY AUTOMATED COUNT: 15 % (ref 9–15)
GFR SERPL CREATININE-BSD FRML MDRD: (no result) ML/MIN (ref 90–?)
GLUCOSE SERPL-MCNC: 96 MG/DL (ref 70–99)
HCT VFR BLD AUTO: 26.1 % (ref 36–48)
HGB BLD-MCNC: 8.8 G/DL (ref 12–16)
LYMPHOCYTES # BLD AUTO: 1 K/UL (ref 1–5.5)
LYMPHOCYTES NFR BLD AUTO: 9.9 % (ref 20.5–51.5)
MCH RBC QN AUTO: 30 PG (ref 27–31)
MCHC RBC AUTO-ENTMCNC: 34 % (ref 32–36)
MCV RBC AUTO: 88 FL (ref 79–98)
MONOCYTES # BLD MANUAL: 0.7 K/UL (ref 0–1)
MONOCYTES # BLD MANUAL: 6.7 % (ref 1.7–9.3)
NEUTROPHILS # BLD AUTO: 8 K/UL (ref 1.8–7.7)
NEUTROPHILS NFR BLD AUTO: 77 % (ref 40–70)
PLATELET # BLD AUTO: 248 K/UL (ref 130–430)
RBC # BLD AUTO: 2.98 MIL/UL (ref 4.2–6.2)
WBC # BLD AUTO: 10.3 K/UL (ref 4.8–10.8)

## 2023-02-17 RX ADMIN — TIMOLOL MALEATE SCH ML: 5 SOLUTION/ DROPS OPHTHALMIC at 09:52

## 2023-02-17 RX ADMIN — Medication SCH CAP: at 09:50

## 2023-02-17 RX ADMIN — LACTULOSE SCH ML: 10 SOLUTION ORAL; RECTAL at 09:00

## 2023-02-17 RX ADMIN — PRAMIPEXOLE DIHYDROCHLORIDE SCH MG: 1 TABLET ORAL at 20:54

## 2023-02-17 RX ADMIN — Medication SCH MG: at 09:50

## 2023-02-17 RX ADMIN — LEVOTHYROXINE SODIUM SCH MG: 50 TABLET ORAL at 05:50

## 2023-02-17 RX ADMIN — AMITRIPTYLINE HYDROCHLORIDE SCH MG: 10 TABLET, FILM COATED ORAL at 20:54

## 2023-02-17 RX ADMIN — DEXTROSE AND SODIUM CHLORIDE SCH MLS/HR: 5; 450 INJECTION, SOLUTION INTRAVENOUS at 10:15

## 2023-02-17 RX ADMIN — CEFTRIAXONE SODIUM SCH MLS/HR: 1 INJECTION, SOLUTION INTRAVENOUS at 12:19

## 2023-02-17 RX ADMIN — TIMOLOL MALEATE SCH ML: 5 SOLUTION/ DROPS OPHTHALMIC at 20:51

## 2023-02-17 RX ADMIN — BACLOFEN SCH MG: 10 TABLET ORAL at 09:50

## 2023-02-17 RX ADMIN — Medication SCH CAP: at 20:52

## 2023-02-17 RX ADMIN — BRIMONIDINE TARTRATE SCH ML: 2 SOLUTION/ DROPS OPHTHALMIC at 00:42

## 2023-02-17 RX ADMIN — BRIMONIDINE TARTRATE SCH ML: 2 SOLUTION/ DROPS OPHTHALMIC at 09:53

## 2023-02-17 RX ADMIN — MORPHINE SULFATE PRN MG: 2 INJECTION, SOLUTION INTRAMUSCULAR; INTRAVENOUS at 22:48

## 2023-02-17 RX ADMIN — BRIMONIDINE TARTRATE SCH ML: 2 SOLUTION/ DROPS OPHTHALMIC at 20:50

## 2023-02-17 RX ADMIN — SODIUM CHLORIDE SCH MG: 9 INJECTION, SOLUTION INTRAVENOUS at 09:51

## 2023-02-17 RX ADMIN — LACTULOSE SCH ML: 10 SOLUTION ORAL; RECTAL at 15:00

## 2023-02-17 RX ADMIN — DEXTROSE AND SODIUM CHLORIDE SCH MLS/HR: 5; 450 INJECTION, SOLUTION INTRAVENOUS at 00:23

## 2023-02-17 RX ADMIN — LATANOPROST SCH ML: 50 SOLUTION OPHTHALMIC at 21:05

## 2023-02-17 RX ADMIN — METRONIDAZOLE SCH MLS/HR: 500 SOLUTION INTRAVENOUS at 14:57

## 2023-02-17 RX ADMIN — DEXTROSE AND SODIUM CHLORIDE SCH MLS/HR: 5; 450 INJECTION, SOLUTION INTRAVENOUS at 20:54

## 2023-02-17 RX ADMIN — LACTULOSE SCH ML: 10 SOLUTION ORAL; RECTAL at 20:51

## 2023-02-17 RX ADMIN — METRONIDAZOLE SCH MLS/HR: 500 SOLUTION INTRAVENOUS at 00:16

## 2023-02-17 RX ADMIN — METRONIDAZOLE SCH MLS/HR: 500 SOLUTION INTRAVENOUS at 21:06

## 2023-02-17 RX ADMIN — METRONIDAZOLE SCH MLS/HR: 500 SOLUTION INTRAVENOUS at 05:49

## 2023-02-17 RX ADMIN — BACLOFEN SCH MG: 10 TABLET ORAL at 20:53

## 2023-02-17 NOTE — NUR
DR PASTOR, A PAGED FOR PEG TUBE ORDERS

ADVISED GI CONSULT FOR PEG TUBE FEEDING

DAUGHTER NOTIFIED BUT DAUGHTER CONCERNED FOR LACK OF FEEDING X 2 DAYS.REFERRED DAUGHTER TO 
CHARGE NURSE

## 2023-02-17 NOTE — NUR
pt resting in bed, pt had extensive amount of brown Liquid stool. noah care provided.

no distress noted

## 2023-02-17 NOTE — NUR
pt had multiple large loose BMs, c/o sore bottom - care given. lactulose doses held. per MD 
note @ 1830 okay to restart tube feeds, awaiting order.

## 2023-02-18 VITALS — SYSTOLIC BLOOD PRESSURE: 108 MMHG

## 2023-02-18 VITALS — SYSTOLIC BLOOD PRESSURE: 156 MMHG

## 2023-02-18 VITALS — SYSTOLIC BLOOD PRESSURE: 106 MMHG

## 2023-02-18 VITALS — SYSTOLIC BLOOD PRESSURE: 124 MMHG

## 2023-02-18 VITALS — SYSTOLIC BLOOD PRESSURE: 144 MMHG

## 2023-02-18 LAB
ANION GAP SERPL CALCULATED.3IONS-SCNC: 5 MMOL/L (ref 5–15)
BASOPHILS # BLD AUTO: 0 K/UL (ref 0–0.2)
BASOPHILS NFR BLD AUTO: 0.5 % (ref 0–2)
BUN SERPL-MCNC: 17 MG/DL (ref 8–21)
CALCIUM SERPL-MCNC: 8.5 MG/DL (ref 8.4–11)
CHLORIDE SERPL-SCNC: 103 MMOL/L (ref 98–107)
CREAT SERPL-MCNC: 0.76 MG/DL (ref 0.55–1.3)
CRP SERPL-MCNC: 4.5 MG/DL (ref 0–0.5)
EOSINOPHIL # BLD AUTO: 0.6 K/UL (ref 0–0.4)
EOSINOPHIL NFR BLD AUTO: 8 % (ref 0–4)
ERYTHROCYTE [DISTWIDTH] IN BLOOD BY AUTOMATED COUNT: 14.9 % (ref 9–15)
ERYTHROCYTE [SEDIMENTATION RATE] IN BLOOD BY WESTERGREN METHOD: 18 MM/HR (ref 0–20)
GFR SERPL CREATININE-BSD FRML MDRD: (no result) ML/MIN (ref 90–?)
GLUCOSE SERPL-MCNC: 122 MG/DL (ref 70–99)
HCT VFR BLD AUTO: 26.2 % (ref 36–48)
HGB BLD-MCNC: 8.9 G/DL (ref 12–16)
LYMPHOCYTES # BLD AUTO: 0.7 K/UL (ref 1–5.5)
LYMPHOCYTES NFR BLD AUTO: 9.4 % (ref 20.5–51.5)
MCH RBC QN AUTO: 30 PG (ref 27–31)
MCHC RBC AUTO-ENTMCNC: 34 % (ref 32–36)
MCV RBC AUTO: 87 FL (ref 79–98)
MONOCYTES # BLD MANUAL: 0.6 K/UL (ref 0–1)
MONOCYTES # BLD MANUAL: 7.7 % (ref 1.7–9.3)
NEUTROPHILS # BLD AUTO: 5.8 K/UL (ref 1.8–7.7)
NEUTROPHILS NFR BLD AUTO: 74.4 % (ref 40–70)
PLATELET # BLD AUTO: 361 K/UL (ref 130–430)
RBC # BLD AUTO: 3.01 MIL/UL (ref 4.2–6.2)
WBC # BLD AUTO: 7.8 K/UL (ref 4.8–10.8)

## 2023-02-18 RX ADMIN — LACTULOSE SCH ML: 10 SOLUTION ORAL; RECTAL at 21:27

## 2023-02-18 RX ADMIN — METRONIDAZOLE SCH MLS/HR: 500 SOLUTION INTRAVENOUS at 13:19

## 2023-02-18 RX ADMIN — MORPHINE SULFATE PRN MG: 2 INJECTION, SOLUTION INTRAMUSCULAR; INTRAVENOUS at 21:27

## 2023-02-18 RX ADMIN — DEXTROSE AND SODIUM CHLORIDE SCH MLS/HR: 5; 450 INJECTION, SOLUTION INTRAVENOUS at 13:49

## 2023-02-18 RX ADMIN — LACTULOSE SCH ML: 10 SOLUTION ORAL; RECTAL at 09:00

## 2023-02-18 RX ADMIN — TIMOLOL MALEATE SCH ML: 5 SOLUTION/ DROPS OPHTHALMIC at 21:29

## 2023-02-18 RX ADMIN — AMITRIPTYLINE HYDROCHLORIDE SCH MG: 10 TABLET, FILM COATED ORAL at 21:31

## 2023-02-18 RX ADMIN — LEVOTHYROXINE SODIUM SCH MG: 50 TABLET ORAL at 06:26

## 2023-02-18 RX ADMIN — TIMOLOL MALEATE SCH ML: 5 SOLUTION/ DROPS OPHTHALMIC at 09:39

## 2023-02-18 RX ADMIN — MORPHINE SULFATE PRN MG: 2 INJECTION, SOLUTION INTRAMUSCULAR; INTRAVENOUS at 18:36

## 2023-02-18 RX ADMIN — LATANOPROST SCH ML: 50 SOLUTION OPHTHALMIC at 21:00

## 2023-02-18 RX ADMIN — BRIMONIDINE TARTRATE SCH ML: 2 SOLUTION/ DROPS OPHTHALMIC at 21:30

## 2023-02-18 RX ADMIN — Medication SCH CAP: at 09:55

## 2023-02-18 RX ADMIN — BRIMONIDINE TARTRATE SCH ML: 2 SOLUTION/ DROPS OPHTHALMIC at 09:39

## 2023-02-18 RX ADMIN — DEXTROSE AND SODIUM CHLORIDE SCH MLS/HR: 5; 450 INJECTION, SOLUTION INTRAVENOUS at 06:26

## 2023-02-18 RX ADMIN — SODIUM CHLORIDE SCH MG: 9 INJECTION, SOLUTION INTRAVENOUS at 09:39

## 2023-02-18 RX ADMIN — PRAMIPEXOLE DIHYDROCHLORIDE SCH MG: 1 TABLET ORAL at 21:31

## 2023-02-18 RX ADMIN — Medication SCH CAP: at 21:28

## 2023-02-18 RX ADMIN — BACLOFEN SCH MG: 10 TABLET ORAL at 18:57

## 2023-02-18 RX ADMIN — Medication SCH MG: at 09:39

## 2023-02-18 RX ADMIN — LINEZOLID SCH MLS/HR: 600 INJECTION, SOLUTION INTRAVENOUS at 21:00

## 2023-02-18 RX ADMIN — LACTULOSE SCH ML: 10 SOLUTION ORAL; RECTAL at 13:49

## 2023-02-18 RX ADMIN — BACLOFEN SCH MG: 10 TABLET ORAL at 09:39

## 2023-02-18 RX ADMIN — METRONIDAZOLE SCH MLS/HR: 500 SOLUTION INTRAVENOUS at 06:26

## 2023-02-18 RX ADMIN — CEFTRIAXONE SODIUM SCH MLS/HR: 1 INJECTION, SOLUTION INTRAVENOUS at 11:48

## 2023-02-18 NOTE — NUR
PT IN BED, AOX2, DAUGHTER BY BEDSIDE, IV INFUSING TO LAC, NO INFILTRATION, LOW BED, WILL 
CONTINUE WITH CARE PAIN

## 2023-02-18 NOTE — NUR
pt tolerating tube feeding - most residual was 100ml. running @ goal of 45 jevity 1.2. urine 
culture positive - ID notified and iv abx changed. pt c/o 10/10 muscle pain in neck - prn 
morphine given and baclofen dose given early per patient request. daughter at bedside.

## 2023-02-19 VITALS — SYSTOLIC BLOOD PRESSURE: 126 MMHG

## 2023-02-19 VITALS — SYSTOLIC BLOOD PRESSURE: 154 MMHG

## 2023-02-19 VITALS — SYSTOLIC BLOOD PRESSURE: 112 MMHG

## 2023-02-19 VITALS — SYSTOLIC BLOOD PRESSURE: 149 MMHG

## 2023-02-19 LAB
ANION GAP SERPL CALCULATED.3IONS-SCNC: 6 MMOL/L (ref 5–15)
BASOPHILS # BLD AUTO: 0 K/UL (ref 0–0.2)
BASOPHILS NFR BLD AUTO: 0.6 % (ref 0–2)
BUN SERPL-MCNC: 14 MG/DL (ref 8–21)
CALCIUM SERPL-MCNC: 8.9 MG/DL (ref 8.4–11)
CHLORIDE SERPL-SCNC: 103 MMOL/L (ref 98–107)
CREAT SERPL-MCNC: 0.63 MG/DL (ref 0.55–1.3)
CRP SERPL-MCNC: 3.6 MG/DL (ref 0–0.5)
EOSINOPHIL # BLD AUTO: 0.6 K/UL (ref 0–0.4)
EOSINOPHIL NFR BLD AUTO: 8.3 % (ref 0–4)
ERYTHROCYTE [DISTWIDTH] IN BLOOD BY AUTOMATED COUNT: 15.2 % (ref 9–15)
ERYTHROCYTE [SEDIMENTATION RATE] IN BLOOD BY WESTERGREN METHOD: 23 MM/HR (ref 0–20)
GFR SERPL CREATININE-BSD FRML MDRD: (no result) ML/MIN (ref 90–?)
GLUCOSE SERPL-MCNC: 113 MG/DL (ref 70–99)
HCT VFR BLD AUTO: 26.6 % (ref 36–48)
HGB BLD-MCNC: 8.9 G/DL (ref 12–16)
LYMPHOCYTES # BLD AUTO: 0.7 K/UL (ref 1–5.5)
LYMPHOCYTES NFR BLD AUTO: 10.3 % (ref 20.5–51.5)
MCH RBC QN AUTO: 29 PG (ref 27–31)
MCHC RBC AUTO-ENTMCNC: 33 % (ref 32–36)
MCV RBC AUTO: 88 FL (ref 79–98)
MONOCYTES # BLD MANUAL: 0.6 K/UL (ref 0–1)
MONOCYTES # BLD MANUAL: 8.2 % (ref 1.7–9.3)
NEUTROPHILS # BLD AUTO: 5.3 K/UL (ref 1.8–7.7)
NEUTROPHILS NFR BLD AUTO: 72.6 % (ref 40–70)
PLATELET # BLD AUTO: 373 K/UL (ref 130–430)
RBC # BLD AUTO: 3.04 MIL/UL (ref 4.2–6.2)
WBC # BLD AUTO: 7.3 K/UL (ref 4.8–10.8)

## 2023-02-19 RX ADMIN — MORPHINE SULFATE PRN MG: 2 INJECTION, SOLUTION INTRAMUSCULAR; INTRAVENOUS at 05:29

## 2023-02-19 RX ADMIN — BACLOFEN SCH MG: 10 TABLET ORAL at 09:52

## 2023-02-19 RX ADMIN — LEVOTHYROXINE SODIUM SCH MG: 50 TABLET ORAL at 06:20

## 2023-02-19 RX ADMIN — Medication SCH CAP: at 09:52

## 2023-02-19 RX ADMIN — TIMOLOL MALEATE SCH ML: 5 SOLUTION/ DROPS OPHTHALMIC at 21:08

## 2023-02-19 RX ADMIN — LINEZOLID SCH MLS/HR: 600 INJECTION, SOLUTION INTRAVENOUS at 21:06

## 2023-02-19 RX ADMIN — BRIMONIDINE TARTRATE SCH ML: 2 SOLUTION/ DROPS OPHTHALMIC at 21:08

## 2023-02-19 RX ADMIN — MORPHINE SULFATE PRN MG: 2 INJECTION, SOLUTION INTRAMUSCULAR; INTRAVENOUS at 01:40

## 2023-02-19 RX ADMIN — TIMOLOL MALEATE SCH ML: 5 SOLUTION/ DROPS OPHTHALMIC at 09:52

## 2023-02-19 RX ADMIN — SODIUM CHLORIDE SCH MG: 9 INJECTION, SOLUTION INTRAVENOUS at 09:52

## 2023-02-19 RX ADMIN — LACTULOSE SCH ML: 10 SOLUTION ORAL; RECTAL at 09:00

## 2023-02-19 RX ADMIN — DEXTROSE AND SODIUM CHLORIDE SCH MLS/HR: 5; 450 INJECTION, SOLUTION INTRAVENOUS at 03:02

## 2023-02-19 RX ADMIN — BRIMONIDINE TARTRATE SCH ML: 2 SOLUTION/ DROPS OPHTHALMIC at 09:52

## 2023-02-19 RX ADMIN — LINEZOLID SCH MLS/HR: 600 INJECTION, SOLUTION INTRAVENOUS at 10:04

## 2023-02-19 RX ADMIN — CEFTRIAXONE SODIUM SCH MLS/HR: 1 INJECTION, SOLUTION INTRAVENOUS at 12:56

## 2023-02-19 RX ADMIN — Medication SCH MG: at 09:52

## 2023-02-19 RX ADMIN — LACTULOSE SCH ML: 10 SOLUTION ORAL; RECTAL at 21:07

## 2023-02-19 RX ADMIN — LACTULOSE SCH ML: 10 SOLUTION ORAL; RECTAL at 15:00

## 2023-02-19 RX ADMIN — LATANOPROST SCH ML: 50 SOLUTION OPHTHALMIC at 21:00

## 2023-02-19 RX ADMIN — BACLOFEN SCH MG: 10 TABLET ORAL at 21:07

## 2023-02-19 RX ADMIN — AMITRIPTYLINE HYDROCHLORIDE SCH MG: 10 TABLET, FILM COATED ORAL at 21:07

## 2023-02-19 RX ADMIN — PRAMIPEXOLE DIHYDROCHLORIDE SCH MG: 1 TABLET ORAL at 21:09

## 2023-02-19 RX ADMIN — Medication SCH CAP: at 21:07

## 2023-02-19 NOTE — NUR
PT WAS SEEN FOR DYSPHAGIA. PT WAS POCKETING FOR TRIALS OF APPLE SAUCE AND SEVERE DELAY IN 
SWALLOW WITH VERBAL REMINDER.



RECOMMENDATION

NPO

## 2023-02-19 NOTE — NUR
speech eval per daughter request. d/c order placed for snf when arranged. vss, tf infusing 
as ordered. c/o pain in neck (chronic), refusal of wanting pain meds prefers baclofen for 
management. fall precautions in place.

## 2023-02-19 NOTE — NUR
AOX2, EVEN AND UNLABORED BREATHING, DENIED SOB, CP, NECK PAIN TOLERATED WITH MORPHINE, RFA 
WITH D5 I/2NS ONGOING W/O INFILTRATION, JEVITY 1.2 INFUSING TO G-TUBE AT 45CC/HR, 2L, OXYGEN 
VIA N/C, SAFETY PEC OBSERVED, LOW BED, CALL LIGHT WITHIN REACH, WILL BE ENDORSED TO INCOMING 
RN

## 2023-02-20 VITALS — SYSTOLIC BLOOD PRESSURE: 141 MMHG

## 2023-02-20 VITALS — SYSTOLIC BLOOD PRESSURE: 99 MMHG

## 2023-02-20 VITALS — SYSTOLIC BLOOD PRESSURE: 97 MMHG

## 2023-02-20 VITALS — SYSTOLIC BLOOD PRESSURE: 100 MMHG

## 2023-02-20 LAB
ALBUMIN SERPL BCP-MCNC: 2.4 G/DL (ref 3.4–4.8)
ALT SERPL W P-5'-P-CCNC: 14 U/L (ref 12–78)
ANION GAP SERPL CALCULATED.3IONS-SCNC: 5 MMOL/L (ref 5–15)
AST SERPL W P-5'-P-CCNC: 11 U/L (ref 10–37)
BASOPHILS # BLD AUTO: 0.1 K/UL (ref 0–0.2)
BASOPHILS NFR BLD AUTO: 0.8 % (ref 0–2)
BILIRUB SERPL-MCNC: 0.2 MG/DL (ref 0–1)
BUN SERPL-MCNC: 12 MG/DL (ref 8–21)
CALCIUM SERPL-MCNC: 8.5 MG/DL (ref 8.4–11)
CHLORIDE SERPL-SCNC: 102 MMOL/L (ref 98–107)
CREAT SERPL-MCNC: 0.76 MG/DL (ref 0.55–1.3)
CRP SERPL-MCNC: 2.9 MG/DL (ref 0–0.5)
EOSINOPHIL # BLD AUTO: 0.7 K/UL (ref 0–0.4)
EOSINOPHIL NFR BLD AUTO: 7.5 % (ref 0–4)
ERYTHROCYTE [DISTWIDTH] IN BLOOD BY AUTOMATED COUNT: 15.1 % (ref 9–15)
ERYTHROCYTE [SEDIMENTATION RATE] IN BLOOD BY WESTERGREN METHOD: 17 MM/HR (ref 0–20)
GFR SERPL CREATININE-BSD FRML MDRD: (no result) ML/MIN (ref 90–?)
GLUCOSE SERPL-MCNC: 120 MG/DL (ref 70–99)
HCT VFR BLD AUTO: 24.6 % (ref 36–48)
HGB BLD-MCNC: 8.5 G/DL (ref 12–16)
LYMPHOCYTES # BLD AUTO: 1 K/UL (ref 1–5.5)
LYMPHOCYTES NFR BLD AUTO: 10.9 % (ref 20.5–51.5)
MCH RBC QN AUTO: 30 PG (ref 27–31)
MCHC RBC AUTO-ENTMCNC: 35 % (ref 32–36)
MCV RBC AUTO: 87 FL (ref 79–98)
MONOCYTES # BLD MANUAL: 0.6 K/UL (ref 0–1)
MONOCYTES # BLD MANUAL: 6.6 % (ref 1.7–9.3)
NEUTROPHILS # BLD AUTO: 6.9 K/UL (ref 1.8–7.7)
NEUTROPHILS NFR BLD AUTO: 74.2 % (ref 40–70)
PLATELET # BLD AUTO: 400 K/UL (ref 130–430)
RBC # BLD AUTO: 2.83 MIL/UL (ref 4.2–6.2)
WBC # BLD AUTO: 9.3 K/UL (ref 4.8–10.8)

## 2023-02-20 RX ADMIN — Medication SCH CAP: at 08:46

## 2023-02-20 RX ADMIN — BRIMONIDINE TARTRATE SCH ML: 2 SOLUTION/ DROPS OPHTHALMIC at 08:47

## 2023-02-20 RX ADMIN — LEVOTHYROXINE SODIUM SCH MG: 50 TABLET ORAL at 06:08

## 2023-02-20 RX ADMIN — CEFTRIAXONE SODIUM SCH MLS/HR: 1 INJECTION, SOLUTION INTRAVENOUS at 12:35

## 2023-02-20 RX ADMIN — Medication SCH MG: at 08:46

## 2023-02-20 RX ADMIN — BACLOFEN SCH MG: 10 TABLET ORAL at 08:46

## 2023-02-20 RX ADMIN — LINEZOLID SCH MLS/HR: 600 INJECTION, SOLUTION INTRAVENOUS at 08:47

## 2023-02-20 RX ADMIN — SODIUM CHLORIDE SCH MG: 9 INJECTION, SOLUTION INTRAVENOUS at 08:46

## 2023-02-20 RX ADMIN — TIMOLOL MALEATE SCH ML: 5 SOLUTION/ DROPS OPHTHALMIC at 08:47

## 2023-02-20 RX ADMIN — LACTULOSE SCH ML: 10 SOLUTION ORAL; RECTAL at 08:46

## 2023-02-20 NOTE — NUR
PATIENT DC TO [facility], FAMILY AWARE, TRANSPORTED VIA [facility], REPORT GIVEN TO 
RECEIVING NURSE MARAL HUMPHREYS IN [facility] DC WITH CATH INTACT,

## 2023-02-20 NOTE — NUR
Pt. is going to [facility] Phone # [phone] with Room #26A at 4PM.  by [facility] 
[facility]. Phone # [phone]